# Patient Record
Sex: MALE | Race: OTHER | URBAN - METROPOLITAN AREA
[De-identification: names, ages, dates, MRNs, and addresses within clinical notes are randomized per-mention and may not be internally consistent; named-entity substitution may affect disease eponyms.]

---

## 2017-01-03 ENCOUNTER — EMERGENCY (EMERGENCY)
Facility: HOSPITAL | Age: 47
LOS: 1 days | Discharge: HOME | End: 2017-01-03
Attending: EMERGENCY MEDICINE | Admitting: EMERGENCY MEDICINE
Payer: MEDICAID

## 2017-01-03 VITALS
SYSTOLIC BLOOD PRESSURE: 98 MMHG | HEART RATE: 60 BPM | OXYGEN SATURATION: 98 % | RESPIRATION RATE: 16 BRPM | DIASTOLIC BLOOD PRESSURE: 57 MMHG | TEMPERATURE: 98 F

## 2017-01-03 VITALS
RESPIRATION RATE: 18 BRPM | HEART RATE: 75 BPM | TEMPERATURE: 98 F | DIASTOLIC BLOOD PRESSURE: 68 MMHG | SYSTOLIC BLOOD PRESSURE: 103 MMHG | OXYGEN SATURATION: 98 %

## 2017-01-03 DIAGNOSIS — R41.82 ALTERED MENTAL STATUS, UNSPECIFIED: ICD-10-CM

## 2017-01-03 DIAGNOSIS — F10.129 ALCOHOL ABUSE WITH INTOXICATION, UNSPECIFIED: ICD-10-CM

## 2017-01-03 PROCEDURE — 99283 EMERGENCY DEPT VISIT LOW MDM: CPT | Mod: 25

## 2017-01-03 NOTE — ED ADULT NURSE NOTE - OBJECTIVE STATEMENT
BIBA for public intox from [facility] authority. Pt admits to drinking excessively at the [employer]. Denies fall or injury, denies drug use. No obvious s/s trauma. Pt sleepy, but rouses to verbal stimuli and is subsequently alert. Oriented x3, + mildly slurred speech, + unsteady gait. Fall precautions in place, will continue to monitor pending sobriety.

## 2017-01-03 NOTE — ED PROVIDER NOTE - OBJECTIVE STATEMENT
47 yo M w/ no pertinent PMHx BIBA for public EtOH intox. Pt presents with +AOB. No acute medical complaints or apparent trauma noted.  Minimally cooperative with hx and ROS due to heavy intox. No bleeding, respiratory distress, pain, vomiting, or urinary/bowel incontinence noted.

## 2017-01-03 NOTE — ED PROVIDER NOTE - MEDICAL DECISION MAKING DETAILS
Patient BIBEMS for suspected alcohol intoxication. History and ROS limited due to altered state.  Observe to clinical sobriety.  PA STATEMENT: I personally performed the services described in the documentation, reviewed the documentation recorded by the scribe in my presence and it accurately and completely records my words and action.

## 2017-01-03 NOTE — ED ADULT NURSE NOTE - CHIEF COMPLAINT QUOTE
Pt BIBA found with an unsteady gait at [facility]. Pt went to the [facility] in [facility] and had a few drinks

## 2017-01-03 NOTE — ED ADULT TRIAGE NOTE - CHIEF COMPLAINT QUOTE
Pt BIBA found with an unsteady gait at [facility]. Pt went to the [facility] in [facility] and had a few drinks

## 2017-01-03 NOTE — ED PROVIDER NOTE - NS ED MD SCRIBE ATTENDING SCRIBE SECTIONS
PAST MEDICAL/SURGICAL/SOCIAL HISTORY/CONSULTATIONS/SHIFT CHANGE/VITAL SIGNS( Pullset)/PROGRESS NOTE/PHYSICAL EXAM/REVIEW OF SYSTEMS/DISPOSITION/RESULTS/HISTORY OF PRESENT ILLNESS/HIV

## 2017-01-03 NOTE — ED PROVIDER NOTE - ATTENDING CONTRIBUTION TO CARE
The patient is now awake and alert, clinically sober.  Able to walk a straight line.  Repeat exam and neuro/cranial nerve exams normal.  No evidence of head/neck trauma.  Patient denies any pain or other complaints.  Denies cp/sob/ha/abd pain.  Abd soft, lungs clear, heart exam normal.  Amy po challenge.  Patient says only used alcohol no other substances.  Denies any assault.  Feels much better and pt feels safe for discharge.  No evidence of intoxication at this time or alcohol withdrawal.  No other complaints on discharge.

## 2017-01-03 NOTE — ED PROVIDER NOTE - CONSTITUTIONAL, MLM
normal... Well appearing, well nourished, awake, alert, oriented to person, place, time/situation and in no apparent distress. +AOB

## 2017-08-24 ENCOUNTER — HOSPITAL ENCOUNTER (INPATIENT)
Dept: HOSPITAL 74 - YASAS | Age: 47
LOS: 4 days | Discharge: HOME | End: 2017-08-28
Attending: INTERNAL MEDICINE | Admitting: INTERNAL MEDICINE
Payer: COMMERCIAL

## 2017-08-24 VITALS — BODY MASS INDEX: 27.1 KG/M2

## 2017-08-24 DIAGNOSIS — F13.230: Primary | ICD-10-CM

## 2017-08-24 DIAGNOSIS — Z93.0: ICD-10-CM

## 2017-08-24 DIAGNOSIS — Z87.898: ICD-10-CM

## 2017-08-24 DIAGNOSIS — B35.3: ICD-10-CM

## 2017-08-24 DIAGNOSIS — F12.20: ICD-10-CM

## 2017-08-24 DIAGNOSIS — F10.230: ICD-10-CM

## 2017-08-24 DIAGNOSIS — R25.2: ICD-10-CM

## 2017-08-24 LAB
APPEARANCE UR: CLEAR
BILIRUB UR STRIP.AUTO-MCNC: NEGATIVE MG/DL
COLOR UR: YELLOW
KETONES UR QL STRIP: NEGATIVE
LEUKOCYTE ESTERASE UR QL STRIP.AUTO: NEGATIVE
NITRITE UR QL STRIP: NEGATIVE
PH UR: 5 [PH] (ref 5–8)
PROT UR QL STRIP: NEGATIVE
PROT UR QL STRIP: NEGATIVE
RBC # UR STRIP: NEGATIVE /UL
SP GR UR: 1.02 (ref 1–1.02)
UROBILINOGEN UR STRIP-MCNC: NEGATIVE MG/DL (ref 0.2–1)

## 2017-08-24 PROCEDURE — 8E09XY8 SUTURE REMOVAL FROM HEAD AND NECK REGION: ICD-10-PCS

## 2017-08-24 PROCEDURE — HZ2ZZZZ DETOXIFICATION SERVICES FOR SUBSTANCE ABUSE TREATMENT: ICD-10-PCS | Performed by: SURGERY

## 2017-08-24 RX ADMIN — ALUMINUM HYDROXIDE, MAGNESIUM HYDROXIDE, AND SIMETHICONE PRN ML: 200; 200; 20 SUSPENSION ORAL at 22:13

## 2017-08-24 RX ADMIN — Medication SCH MG: at 22:12

## 2017-08-24 RX ADMIN — BACITRACIN ZINC SCH GM: 500 OINTMENT TOPICAL at 22:12
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
## 2017-08-25 LAB
ALBUMIN SERPL-MCNC: 3.7 G/DL (ref 3.4–5)
ALP SERPL-CCNC: 97 U/L (ref 45–117)
ALT SERPL-CCNC: 25 U/L (ref 12–78)
ANION GAP SERPL CALC-SCNC: 7 MMOL/L (ref 8–16)
AST SERPL-CCNC: 19 U/L (ref 15–37)
BILIRUB SERPL-MCNC: 0.3 MG/DL (ref 0.2–1)
CALCIUM SERPL-MCNC: 8.3 MG/DL (ref 8.5–10.1)
CO2 SERPL-SCNC: 27 MMOL/L (ref 21–32)
CREAT SERPL-MCNC: 0.8 MG/DL (ref 0.7–1.3)
DEPRECATED RDW RBC AUTO: 14.8 % (ref 11.9–15.9)
GLUCOSE SERPL-MCNC: 107 MG/DL (ref 74–106)
HIV 1 & 2 AB: NEGATIVE
HIV 1 AGP24: NEGATIVE
MCH RBC QN AUTO: 28.2 PG (ref 25.7–33.7)
MCHC RBC AUTO-ENTMCNC: 32.3 G/DL (ref 32–35.9)
MCV RBC: 87.4 FL (ref 80–96)
PLATELET # BLD AUTO: 233 K/MM3 (ref 134–434)
PMV BLD: 8.4 FL (ref 7.5–11.1)
PROT SERPL-MCNC: 7.7 G/DL (ref 6.4–8.2)
WBC # BLD AUTO: 4.4 K/MM3 (ref 4–10)

## 2017-08-25 RX ADMIN — LOPERAMIDE HYDROCHLORIDE PRN MG: 2 CAPSULE ORAL at 19:46

## 2017-08-25 RX ADMIN — Medication SCH TAB: at 10:28

## 2017-08-25 RX ADMIN — BACITRACIN ZINC SCH GM: 500 OINTMENT TOPICAL at 22:15

## 2017-08-25 RX ADMIN — BACITRACIN ZINC SCH GM: 500 OINTMENT TOPICAL at 10:28

## 2017-08-25 RX ADMIN — LOPERAMIDE HYDROCHLORIDE PRN MG: 2 CAPSULE ORAL at 08:48

## 2017-08-25 RX ADMIN — Medication SCH MG: at 22:15

## 2017-08-25 NOTE — PN
S CIWA





- CIWA Score


Nausea/Vomitin


Muscle Tremors: 3


Anxiety: 3


Agitation: 2


Paroxysmal Sweats: 3


Orientation: 0-Oriented


Tacttile Disturbances: 2-Mild Itch/Numbness/Burn


Auditory Disturbances: 0-None


Visual Disturbances: 0-None


Headache: 0-None Present


CIWA-Ar Total Score: 15





BHS Progress Note (SOAP)


Subjective: 


sweats, shakes ,,cramps 


Objective: 





17 11:49


 Vital Signs











Temperature  96.8 F L  17 10:24


 


Pulse Rate  69   17 10:24


 


Respiratory Rate  18   17 10:24


 


Blood Pressure  131/78   17 10:24


 


O2 Sat by Pulse Oximetry (%)      








 Laboratory Tests











  17





  12:40 15:00 06:00


 


WBC    4.4


 


RBC    4.00


 


Hgb    11.3 L


 


Hct    35.0 L


 


MCV    87.4


 


MCH    28.2


 


MCHC    32.3


 


RDW    14.8


 


Plt Count    233


 


MPV    8.4


 


Sodium   


 


Potassium   


 


Chloride   


 


Carbon Dioxide   


 


Anion Gap   


 


BUN   


 


Creatinine   


 


Creat Clearance w eGFR   


 


Random Glucose   


 


Calcium   


 


Total Bilirubin   


 


AST   


 


ALT   


 


Alkaline Phosphatase   


 


Total Protein   


 


Albumin   


 


Urine Color   Yellow 


 


Urine Appearance   Clear 


 


Urine pH   5.0  D 


 


Ur Specific Gravity   1.025 


 


Urine Protein   Negative 


 


Urine Glucose (UA)   Negative 


 


Urine Ketones   Negative 


 


Urine Blood   Negative 


 


Urine Nitrite   Negative 


 


Urine Bilirubin   Negative 


 


Urine Urobilinogen   Negative 


 


Ur Leukocyte Esterase   Negative 


 


HIV 1&2 Antibody Screen  Negative  


 


HIV P24 Antigen  Negative  














  17





  06:00


 


WBC 


 


RBC 


 


Hgb 


 


Hct 


 


MCV 


 


MCH 


 


MCHC 


 


RDW 


 


Plt Count 


 


MPV 


 


Sodium  141


 


Potassium  4.0


 


Chloride  107


 


Carbon Dioxide  27


 


Anion Gap  7 L


 


BUN  20 H


 


Creatinine  0.8


 


Creat Clearance w eGFR  > 60


 


Random Glucose  107 H


 


Calcium  8.3 L


 


Total Bilirubin  0.3  D


 


AST  19  D


 


ALT  25  D


 


Alkaline Phosphatase  97


 


Total Protein  7.7


 


Albumin  3.7


 


Urine Color 


 


Urine Appearance 


 


Urine pH 


 


Ur Specific Gravity 


 


Urine Protein 


 


Urine Glucose (UA) 


 


Urine Ketones 


 


Urine Blood 


 


Urine Nitrite 


 


Urine Bilirubin 


 


Urine Urobilinogen 


 


Ur Leukocyte Esterase 


 


HIV 1&2 Antibody Screen 


 


HIV P24 Antigen 








pt aox3 in nad ambulating 


Assessment: 


17 11:49


withdrawal sx's 


t.pedis


cramps i  toes 








17 11:50





Plan: 


cont. detox 


increase fluids 


tinactin bid 


flexeril 10mg tid

## 2017-08-25 NOTE — EKG
Test Reason : 

Blood Pressure : ***/*** mmHG

Vent. Rate : 054 BPM     Atrial Rate : 054 BPM

   P-R Int : 132 ms          QRS Dur : 110 ms

    QT Int : 460 ms       P-R-T Axes : 075 072 039 degrees

   QTc Int : 436 ms

 

SINUS BRADYCARDIA

ST ELEVATION, CONSIDER EARLY REPOLARIZATION

OTHERWISE NORMAL ECG

NO PREVIOUS ECGS AVAILABLE

Confirmed by MD ETTA, CONSUELO ([id number]) on 8/25/2017 12:56:32 PM

 

Referred By:             Confirmed By:CONSUELO QUILES MD

## 2017-08-26 RX ADMIN — TOLNAFTATE SCH APPLIC: 10 CREAM TOPICAL at 22:37

## 2017-08-26 RX ADMIN — ALUMINUM HYDROXIDE, MAGNESIUM HYDROXIDE, AND SIMETHICONE PRN ML: 200; 200; 20 SUSPENSION ORAL at 22:38

## 2017-08-26 RX ADMIN — TOLNAFTATE SCH APPLIC: 10 CREAM TOPICAL at 13:05

## 2017-08-26 RX ADMIN — Medication SCH APPLIC: at 13:04

## 2017-08-26 RX ADMIN — Medication SCH TAB: at 10:34

## 2017-08-26 RX ADMIN — Medication SCH MG: at 22:37

## 2017-08-26 RX ADMIN — PHENOL PRN EACH: 14.5 LOZENGE ORAL at 05:37

## 2017-08-26 RX ADMIN — BACITRACIN ZINC SCH GM: 500 OINTMENT TOPICAL at 22:37

## 2017-08-26 RX ADMIN — BACITRACIN ZINC SCH GM: 500 OINTMENT TOPICAL at 10:35

## 2017-08-26 NOTE — PN
S CIWA





- CIWA Score


Nausea/Vomitin


Muscle Tremors: None


Anxiety: 4-Mod. Anxious/Guarded


Agitation: 2


Paroxysmal Sweats: 3


Orientation: 0-Oriented


Tacttile Disturbances: 0-None


Auditory Disturbances: 2-Mild Harshness/Frighten


Visual Disturbances: 3-Moderate Sensitivity


Headache: 0-None Present


CIWA-Ar Total Score: 16





BHS Progress Note (SOAP)


Subjective: 


Interrupted sleep, Stomach Cramping, Body Aches, H/A, Diarrhea, Sweating.


Objective: 


PT. A &  O X 3. NO ACUTE DISTRESS.


PT. DENIES CHEST PAIN.





17 14:17


 Vital Signs











Temperature  97.9 F   17 10:11


 


Pulse Rate  75   17 10:11


 


Respiratory Rate  18   17 10:11


 


Blood Pressure  137/73   17 10:11


 


O2 Sat by Pulse Oximetry (%)      








 Laboratory Tests











  17





  12:40 15:00 06:00


 


WBC    4.4


 


RBC    4.00


 


Hgb    11.3 L


 


Hct    35.0 L


 


MCV    87.4


 


MCH    28.2


 


MCHC    32.3


 


RDW    14.8


 


Plt Count    233


 


MPV    8.4


 


Sodium   


 


Potassium   


 


Chloride   


 


Carbon Dioxide   


 


Anion Gap   


 


BUN   


 


Creatinine   


 


Creat Clearance w eGFR   


 


Random Glucose   


 


Calcium   


 


Total Bilirubin   


 


AST   


 


ALT   


 


Alkaline Phosphatase   


 


Total Protein   


 


Albumin   


 


Urine Color   Yellow 


 


Urine Appearance   Clear 


 


Urine pH   5.0  D 


 


Ur Specific Gravity   1.025 


 


Urine Protein   Negative 


 


Urine Glucose (UA)   Negative 


 


Urine Ketones   Negative 


 


Urine Blood   Negative 


 


Urine Nitrite   Negative 


 


Urine Bilirubin   Negative 


 


Urine Urobilinogen   Negative 


 


Ur Leukocyte Esterase   Negative 


 


RPR Titer   


 


HIV 1&2 Antibody Screen  Negative  


 


HIV P24 Antigen  Negative  














  17





  06:00 06:00


 


WBC  


 


RBC  


 


Hgb  


 


Hct  


 


MCV  


 


MCH  


 


MCHC  


 


RDW  


 


Plt Count  


 


MPV  


 


Sodium  141 


 


Potassium  4.0 


 


Chloride  107 


 


Carbon Dioxide  27 


 


Anion Gap  7 L 


 


BUN  20 H 


 


Creatinine  0.8 


 


Creat Clearance w eGFR  > 60 


 


Random Glucose  107 H 


 


Calcium  8.3 L 


 


Total Bilirubin  0.3  D 


 


AST  19  D 


 


ALT  25  D 


 


Alkaline Phosphatase  97 


 


Total Protein  7.7 


 


Albumin  3.7 


 


Urine Color  


 


Urine Appearance  


 


Urine pH  


 


Ur Specific Gravity  


 


Urine Protein  


 


Urine Glucose (UA)  


 


Urine Ketones  


 


Urine Blood  


 


Urine Nitrite  


 


Urine Bilirubin  


 


Urine Urobilinogen  


 


Ur Leukocyte Esterase  


 


RPR Titer   Nonreactive


 


HIV 1&2 Antibody Screen  


 


HIV P24 Antigen  








LABS NOTED.


Assessment: 





17 14:18


WITHDRAWAL SYMPTOMS.


Plan: 


CONTINUE DETOX.


INCREASE PO FLUID INTAKE.


PRN FLEXERIL FOR BODY ACHES / MUSCLE SPASMS.

## 2017-08-27 RX ADMIN — BACITRACIN ZINC SCH GM: 500 OINTMENT TOPICAL at 22:16

## 2017-08-27 RX ADMIN — PHENOL PRN EACH: 14.5 LOZENGE ORAL at 05:29

## 2017-08-27 RX ADMIN — TOLNAFTATE SCH: 10 CREAM TOPICAL at 22:17

## 2017-08-27 RX ADMIN — LOPERAMIDE HYDROCHLORIDE PRN MG: 2 CAPSULE ORAL at 05:29

## 2017-08-27 RX ADMIN — Medication SCH APPLIC: at 10:34

## 2017-08-27 RX ADMIN — Medication SCH TAB: at 10:33

## 2017-08-27 RX ADMIN — BACITRACIN ZINC SCH GM: 500 OINTMENT TOPICAL at 10:32

## 2017-08-27 RX ADMIN — TOLNAFTATE SCH APPLIC: 10 CREAM TOPICAL at 10:32

## 2017-08-27 RX ADMIN — Medication SCH MG: at 22:16
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
## 2017-08-28 VITALS — HEART RATE: 78 BPM | DIASTOLIC BLOOD PRESSURE: 78 MMHG | TEMPERATURE: 97 F | SYSTOLIC BLOOD PRESSURE: 140 MMHG

## 2018-07-27 ENCOUNTER — HOSPITAL ENCOUNTER (INPATIENT)
Dept: HOSPITAL 74 - YASAS | Age: 48
LOS: 4 days | Discharge: HOME | End: 2018-07-31
Attending: SURGERY | Admitting: SURGERY
Payer: COMMERCIAL

## 2018-07-27 VITALS — BODY MASS INDEX: 27.1 KG/M2

## 2018-07-27 DIAGNOSIS — K21.9: ICD-10-CM

## 2018-07-27 DIAGNOSIS — F13.230: ICD-10-CM

## 2018-07-27 DIAGNOSIS — F10.230: Primary | ICD-10-CM

## 2018-07-27 DIAGNOSIS — R19.7: ICD-10-CM

## 2018-07-27 PROCEDURE — HZ2ZZZZ DETOXIFICATION SERVICES FOR SUBSTANCE ABUSE TREATMENT: ICD-10-PCS | Performed by: SURGERY

## 2018-07-27 RX ADMIN — Medication SCH MG: at 22:11

## 2018-07-27 RX ADMIN — RANITIDINE SCH MG: 150 TABLET ORAL at 13:37

## 2018-07-27 RX ADMIN — Medication PRN MG: at 22:11
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
## 2018-07-28 LAB
ALBUMIN SERPL-MCNC: 4.2 G/DL (ref 3.4–5)
ALP SERPL-CCNC: 92 U/L (ref 45–117)
ALT SERPL-CCNC: 40 U/L (ref 12–78)
ANION GAP SERPL CALC-SCNC: 7 MMOL/L (ref 8–16)
APPEARANCE UR: (no result)
AST SERPL-CCNC: 40 U/L (ref 15–37)
BACTERIA #/AREA URNS HPF: (no result) /HPF
BILIRUB SERPL-MCNC: 1.2 MG/DL (ref 0.2–1)
BILIRUB UR STRIP.AUTO-MCNC: NEGATIVE MG/DL
BUN SERPL-MCNC: 19 MG/DL (ref 7–18)
CALCIUM SERPL-MCNC: 8.9 MG/DL (ref 8.5–10.1)
CHLORIDE SERPL-SCNC: 107 MMOL/L (ref 98–107)
CO2 SERPL-SCNC: 26 MMOL/L (ref 21–32)
COLOR UR: (no result)
CREAT SERPL-MCNC: 1 MG/DL (ref 0.7–1.3)
DEPRECATED RDW RBC AUTO: 15.3 % (ref 11.9–15.9)
GLUCOSE SERPL-MCNC: 124 MG/DL (ref 74–106)
HCT VFR BLD CALC: 36.8 % (ref 35.4–49)
HGB BLD-MCNC: 12.3 GM/DL (ref 11.7–16.9)
KETONES UR QL STRIP: NEGATIVE
LEUKOCYTE ESTERASE UR QL STRIP.AUTO: NEGATIVE
MCH RBC QN AUTO: 30.2 PG (ref 25.7–33.7)
MCHC RBC AUTO-ENTMCNC: 33.3 G/DL (ref 32–35.9)
MCV RBC: 90.5 FL (ref 80–96)
MUCOUS THREADS URNS QL MICRO: (no result)
NITRITE UR QL STRIP: NEGATIVE
PH UR: 5 [PH] (ref 5–8)
PLATELET # BLD AUTO: 249 K/MM3 (ref 134–434)
PMV BLD: 8.3 FL (ref 7.5–11.1)
POTASSIUM SERPLBLD-SCNC: 3.8 MMOL/L (ref 3.5–5.1)
PROT SERPL-MCNC: 8.1 G/DL (ref 6.4–8.2)
PROT UR QL STRIP: NEGATIVE
PROT UR QL STRIP: NEGATIVE
RBC # BLD AUTO: 4.07 M/MM3 (ref 4–5.6)
SODIUM SERPL-SCNC: 140 MMOL/L (ref 136–145)
SP GR UR: 1.02 (ref 1–1.03)
UROBILINOGEN UR STRIP-MCNC: 2 MG/DL (ref 0.2–1)
WBC # BLD AUTO: 4.5 K/MM3 (ref 4–10)

## 2018-07-28 RX ADMIN — RANITIDINE SCH MG: 150 TABLET ORAL at 10:34

## 2018-07-28 RX ADMIN — HYDROXYZINE PAMOATE PRN MG: 25 CAPSULE ORAL at 22:30

## 2018-07-28 RX ADMIN — LOPERAMIDE HYDROCHLORIDE PRN MG: 2 CAPSULE ORAL at 13:46

## 2018-07-28 RX ADMIN — Medication PRN MG: at 22:31

## 2018-07-28 RX ADMIN — Medication SCH TAB: at 10:34

## 2018-07-28 RX ADMIN — ALUMINUM HYDROXIDE, MAGNESIUM HYDROXIDE, AND SIMETHICONE PRN ML: 200; 200; 20 SUSPENSION ORAL at 05:55

## 2018-07-28 RX ADMIN — Medication SCH MG: at 22:30

## 2018-07-28 NOTE — PN
[facility] CIWA





- CIWA Score


Nausea/Vomitin-No Nausea/No Vomiting


Muscle Tremors: 4-Moderate,w/Arms Extend


Anxiety: 3


Agitation: 3


Paroxysmal Sweats: 3


Orientation: 0-Oriented


Tacttile Disturbances: 2-Mild Itch/Numbness/Burn


Auditory Disturbances: 2-Mild Harshness/Frighten


Visual Disturbances: 0-None


Headache: 0-None Present


CIWA-Ar Total Score: 17





BHS Progress Note (SOAP)


Subjective: 





Interrupted Sleep, Tremors, Diarrhea, Sweating, Body Aches.


Objective: 


PATIENT A & O X 3, OBSERVED AMBULATING ON UNIT. NO ACUTE DISTRESS.





18 17:48


 Vital Signs











Temperature  96.7 F L  18 14:01


 


Pulse Rate  55 L  18 14:01


 


Respiratory Rate  18   18 14:01


 


Blood Pressure  151/79   18 14:01


 


O2 Sat by Pulse Oximetry (%)      








 Laboratory Tests











  18





  12:00 05:30 05:30


 


WBC   4.5 


 


RBC   4.07 


 


Hgb   12.3 


 


Hct   36.8 


 


MCV   90.5 


 


MCH   30.2 


 


MCHC   33.3 


 


RDW   15.3 


 


Plt Count   249 


 


MPV   8.3 


 


Sodium    140


 


Potassium    3.8


 


Chloride    107


 


Carbon Dioxide    26


 


Anion Gap    7 L


 


BUN    19 H


 


Creatinine    1.0


 


Creat Clearance w eGFR    > 60


 


Random Glucose    124 H


 


Calcium    8.9


 


Total Bilirubin    1.2 H


 


AST    40 H D


 


ALT    40  D


 


Alkaline Phosphatase    92


 


Total Protein    8.1


 


Albumin    4.2


 


Urine Color   


 


Urine Appearance   


 


Urine pH   


 


Ur Specific Gravity   


 


Urine Protein   


 


Urine Glucose (UA)   


 


Urine Ketones   


 


Urine Blood   


 


Urine Nitrite   


 


Urine Bilirubin   


 


Urine Urobilinogen   


 


Ur Leukocyte Esterase   


 


Urine WBC (Auto)   


 


Urine RBC (Auto)   


 


Urine Bacteria   


 


Urine Mucus   


 


RPR Titer   


 


HIV 1&2 Antibody Screen  Negative  


 


HIV P24 Antigen  Negative  














  18





  05:30 05:50


 


WBC  


 


RBC  


 


Hgb  


 


Hct  


 


MCV  


 


MCH  


 


MCHC  


 


RDW  


 


Plt Count  


 


MPV  


 


Sodium  


 


Potassium  


 


Chloride  


 


Carbon Dioxide  


 


Anion Gap  


 


BUN  


 


Creatinine  


 


Creat Clearance w eGFR  


 


Random Glucose  


 


Calcium  


 


Total Bilirubin  


 


AST  


 


ALT  


 


Alkaline Phosphatase  


 


Total Protein  


 


Albumin  


 


Urine Color   Melissa


 


Urine Appearance   Turbid


 


Urine pH   5.0


 


Ur Specific Gravity   1.023


 


Urine Protein   Negative


 


Urine Glucose (UA)   Negative


 


Urine Ketones   Negative


 


Urine Blood   1+ H


 


Urine Nitrite   Negative


 


Urine Bilirubin   Negative


 


Urine Urobilinogen   2.0


 


Ur Leukocyte Esterase   Negative


 


Urine WBC (Auto)   2


 


Urine RBC (Auto)   4


 


Urine Bacteria   Rare


 


Urine Mucus   Few


 


RPR Titer  Nonreactive 


 


HIV 1&2 Antibody Screen  


 


HIV P24 Antigen  








LABS NOTED.


Assessment: 





18 17:48


WITHDRAWAL SYMPTOMS.


Plan: 





CONTINUE DETOX.


INCREASE DAILY PO FLUID INTAKE.


PRN FLEXERIL FOR BODY ACHES / MUSCLE SPASMS.

## 2018-07-29 RX ADMIN — RANITIDINE SCH MG: 150 TABLET ORAL at 10:44

## 2018-07-29 RX ADMIN — Medication SCH MG: at 22:24

## 2018-07-29 RX ADMIN — HYDROXYZINE PAMOATE PRN MG: 25 CAPSULE ORAL at 22:24

## 2018-07-29 RX ADMIN — TETRAHYDROZOLINE HCL PRN DROP: 0.05 SOLUTION/ DROPS OPHTHALMIC at 10:44

## 2018-07-29 RX ADMIN — Medication SCH TAB: at 10:43

## 2018-07-29 RX ADMIN — ALUMINUM HYDROXIDE, MAGNESIUM HYDROXIDE, AND SIMETHICONE PRN ML: 200; 200; 20 SUSPENSION ORAL at 05:22

## 2018-07-29 RX ADMIN — LOPERAMIDE HYDROCHLORIDE PRN MG: 2 CAPSULE ORAL at 17:51

## 2018-07-29 NOTE — PN
[facility] CIWA





- CIWA Score


Nausea/Vomitin-Mild Nausea/No Vomiting


Muscle Tremors: 1-None Visible, but Felt


Anxiety: 1-Mildly Anxious


Agitation: 1-Slight > Activity


Paroxysmal Sweats: 1-Minimal Palms Moist


Orientation: 0-Oriented


Tacttile Disturbances: 0-None


Auditory Disturbances: 0-None


Visual Disturbances: 0-None


Headache: 0-None Present


CIWA-Ar Total Score: 5





BHS Progress Note (SOAP)


Subjective: 





pt complaining of diarrhea- watery stool-able to take po, no fevers, no abd pain

, no recent antibiotic use. d/w pt imodium prn that he can ask for-


Objective: 





18 15:35


 Vital Signs - 24 hr











  18





  18:18 22:24 00:30


 


Temperature 98.0 F 98.4 F 


 


Pulse Rate 55 L 57 L 


 


Respiratory 16 18 18





Rate   


 


Blood Pressure 139/74 155/88 














  18





  03:30 06:26 09:28


 


Temperature  97.4 F L 97 F L


 


Pulse Rate  50 L 70


 


Respiratory 18 18 20





Rate   


 


Blood Pressure  134/70 138/82














  18





  13:33


 


Temperature 98.6 F


 


Pulse Rate 68


 


Respiratory 18





Rate 


 


Blood Pressure 140/83








 Breath Alcohol Content











Breath Alcohol Content         0











 Laboratory Tests











  18





  12:00 05:30 05:30


 


WBC   4.5 


 


RBC   4.07 


 


Hgb   12.3 


 


Hct   36.8 


 


MCV   90.5 


 


MCH   30.2 


 


MCHC   33.3 


 


RDW   15.3 


 


Plt Count   249 


 


MPV   8.3 


 


Sodium    140


 


Potassium    3.8


 


Chloride    107


 


Carbon Dioxide    26


 


Anion Gap    7 L


 


BUN    19 H


 


Creatinine    1.0


 


Creat Clearance w eGFR    > 60


 


Random Glucose    124 H


 


Calcium    8.9


 


Total Bilirubin    1.2 H


 


AST    40 H D


 


ALT    40  D


 


Alkaline Phosphatase    92


 


Total Protein    8.1


 


Albumin    4.2


 


Urine Color   


 


Urine Appearance   


 


Urine pH   


 


Ur Specific Gravity   


 


Urine Protein   


 


Urine Glucose (UA)   


 


Urine Ketones   


 


Urine Blood   


 


Urine Nitrite   


 


Urine Bilirubin   


 


Urine Urobilinogen   


 


Ur Leukocyte Esterase   


 


Urine WBC (Auto)   


 


Urine RBC (Auto)   


 


Urine Bacteria   


 


Urine Mucus   


 


RPR Titer   


 


HIV 1&2 Antibody Screen  Negative  


 


HIV P24 Antigen  Negative  














  18





  05:30 05:50


 


WBC  


 


RBC  


 


Hgb  


 


Hct  


 


MCV  


 


MCH  


 


MCHC  


 


RDW  


 


Plt Count  


 


MPV  


 


Sodium  


 


Potassium  


 


Chloride  


 


Carbon Dioxide  


 


Anion Gap  


 


BUN  


 


Creatinine  


 


Creat Clearance w eGFR  


 


Random Glucose  


 


Calcium  


 


Total Bilirubin  


 


AST  


 


ALT  


 


Alkaline Phosphatase  


 


Total Protein  


 


Albumin  


 


Urine Color   Melissa


 


Urine Appearance   Turbid


 


Urine pH   5.0


 


Ur Specific Gravity   1.023


 


Urine Protein   Negative


 


Urine Glucose (UA)   Negative


 


Urine Ketones   Negative


 


Urine Blood   1+ H


 


Urine Nitrite   Negative


 


Urine Bilirubin   Negative


 


Urine Urobilinogen   2.0


 


Ur Leukocyte Esterase   Negative


 


Urine WBC (Auto)   2


 


Urine RBC (Auto)   4


 


Urine Bacteria   Rare


 


Urine Mucus   Few


 


RPR Titer  Nonreactive 


 


HIV 1&2 Antibody Screen  


 


HIV P24 Antigen  








nl VS


increased liver enzymes


soft, NT abd, pt walking around


Assessment: 





18 15:36


here for detox form alcohol,xanax


Plan: 





continue detox


prn imodium

## 2018-07-30 RX ADMIN — Medication SCH MG: at 22:01

## 2018-07-30 RX ADMIN — Medication SCH TAB: at 10:44

## 2018-07-30 RX ADMIN — LOPERAMIDE HYDROCHLORIDE PRN MG: 2 CAPSULE ORAL at 01:52

## 2018-07-30 RX ADMIN — TETRAHYDROZOLINE HCL PRN DROP: 0.05 SOLUTION/ DROPS OPHTHALMIC at 10:47

## 2018-07-30 RX ADMIN — RANITIDINE SCH MG: 150 TABLET ORAL at 10:44

## 2018-07-30 NOTE — PN
BHS Progress Note (SOAP)


Subjective: 





Sweating, Diarrhea, Stomach Cramping, Body Aches, Fatigue, Tremors.


Objective: 


PATIENT A & O X 3. NO ACUTE DISTRESS.





07/30/18 12:18


 Vital Signs











Temperature  97.9 F   07/30/18 09:44


 


Pulse Rate  63   07/30/18 09:44


 


Respiratory Rate  18   07/30/18 09:44


 


Blood Pressure  141/79   07/30/18 09:44


 


O2 Sat by Pulse Oximetry (%)      








 Laboratory Tests











  07/27/18 07/28/18 07/28/18





  12:00 05:30 05:30


 


WBC   4.5 


 


RBC   4.07 


 


Hgb   12.3 


 


Hct   36.8 


 


MCV   90.5 


 


MCH   30.2 


 


MCHC   33.3 


 


RDW   15.3 


 


Plt Count   249 


 


MPV   8.3 


 


Sodium    140


 


Potassium    3.8


 


Chloride    107


 


Carbon Dioxide    26


 


Anion Gap    7 L


 


BUN    19 H


 


Creatinine    1.0


 


Creat Clearance w eGFR    > 60


 


Random Glucose    124 H


 


Calcium    8.9


 


Total Bilirubin    1.2 H


 


AST    40 H D


 


ALT    40  D


 


Alkaline Phosphatase    92


 


Total Protein    8.1


 


Albumin    4.2


 


Urine Color   


 


Urine Appearance   


 


Urine pH   


 


Ur Specific Gravity   


 


Urine Protein   


 


Urine Glucose (UA)   


 


Urine Ketones   


 


Urine Blood   


 


Urine Nitrite   


 


Urine Bilirubin   


 


Urine Urobilinogen   


 


Ur Leukocyte Esterase   


 


Urine WBC (Auto)   


 


Urine RBC (Auto)   


 


Urine Bacteria   


 


Urine Mucus   


 


RPR Titer   


 


HIV 1&2 Antibody Screen  Negative  


 


HIV P24 Antigen  Negative  














  07/28/18 07/28/18





  05:30 05:50


 


WBC  


 


RBC  


 


Hgb  


 


Hct  


 


MCV  


 


MCH  


 


MCHC  


 


RDW  


 


Plt Count  


 


MPV  


 


Sodium  


 


Potassium  


 


Chloride  


 


Carbon Dioxide  


 


Anion Gap  


 


BUN  


 


Creatinine  


 


Creat Clearance w eGFR  


 


Random Glucose  


 


Calcium  


 


Total Bilirubin  


 


AST  


 


ALT  


 


Alkaline Phosphatase  


 


Total Protein  


 


Albumin  


 


Urine Color   Melissa


 


Urine Appearance   Turbid


 


Urine pH   5.0


 


Ur Specific Gravity   1.023


 


Urine Protein   Negative


 


Urine Glucose (UA)   Negative


 


Urine Ketones   Negative


 


Urine Blood   1+ H


 


Urine Nitrite   Negative


 


Urine Bilirubin   Negative


 


Urine Urobilinogen   2.0


 


Ur Leukocyte Esterase   Negative


 


Urine WBC (Auto)   2


 


Urine RBC (Auto)   4


 


Urine Bacteria   Rare


 


Urine Mucus   Few


 


RPR Titer  Nonreactive 


 


HIV 1&2 Antibody Screen  


 


HIV P24 Antigen  








LABS NOTED.


Assessment: 





07/30/18 12:18


WITHDRAWAL SYMPTOMS.


Plan: 





CONTINUE DETOX.


INCREASE DAILY PO FLUID INTAKE.


PRN IMMODIUM FOR DIARRHEA.





PATIENT SCHEDULED FOR D/C TOMORROW.

## 2018-07-30 NOTE — EKG
Test Reason : 

Blood Pressure : ***/*** mmHG

Vent. Rate : 051 BPM     Atrial Rate : 051 BPM

   P-R Int : 138 ms          QRS Dur : 108 ms

    QT Int : 458 ms       P-R-T Axes : 070 070 042 degrees

   QTc Int : 422 ms

 

SINUS BRADYCARDIA

POSSIBLE LEFT ATRIAL ENLARGEMENT

LEFT VENTRICULAR HYPERTROPHY

ABNORMAL ECG

WHEN COMPARED WITH ECG OF 24-AUG-2017 13:51,

NO SIGNIFICANT CHANGE WAS FOUND

Confirmed by VALERIE WILDER MD ([id number]) on 7/30/2018 11:10:26 AM

 

Referred By:             Confirmed By:VALERIE WILDER MD

## 2018-07-31 VITALS — DIASTOLIC BLOOD PRESSURE: 77 MMHG | SYSTOLIC BLOOD PRESSURE: 141 MMHG | HEART RATE: 51 BPM | TEMPERATURE: 97.9 F

## 2018-10-12 ENCOUNTER — HOSPITAL ENCOUNTER (INPATIENT)
Dept: HOSPITAL 74 - YASAS | Age: 48
LOS: 4 days | Discharge: HOME | End: 2018-10-16
Attending: INTERNAL MEDICINE | Admitting: INTERNAL MEDICINE
Payer: COMMERCIAL

## 2018-10-12 VITALS — BODY MASS INDEX: 26.6 KG/M2

## 2018-10-12 DIAGNOSIS — Z87.898: ICD-10-CM

## 2018-10-12 DIAGNOSIS — E80.7: ICD-10-CM

## 2018-10-12 DIAGNOSIS — F10.230: Primary | ICD-10-CM

## 2018-10-12 DIAGNOSIS — F13.230: ICD-10-CM

## 2018-10-12 DIAGNOSIS — F12.20: ICD-10-CM

## 2018-10-12 DIAGNOSIS — R82.90: ICD-10-CM

## 2018-10-12 LAB
APPEARANCE UR: CLEAR
BILIRUB UR STRIP.AUTO-MCNC: NEGATIVE MG/DL
COLOR UR: YELLOW
EPITH CASTS URNS QL MICRO: (no result) /HPF
HYALINE CASTS URNS QL MICRO: 3 /LPF
KETONES UR QL STRIP: NEGATIVE
LEUKOCYTE ESTERASE UR QL STRIP.AUTO: NEGATIVE
MUCOUS THREADS URNS QL MICRO: (no result)
NITRITE UR QL STRIP: NEGATIVE
PH UR: 7 [PH] (ref 5–8)
PROT UR QL STRIP: (no result)
PROT UR QL STRIP: NEGATIVE
SP GR UR: 1.02 (ref 1.01–1.03)
UROBILINOGEN UR STRIP-MCNC: 2 MG/DL (ref 0.2–1)

## 2018-10-12 PROCEDURE — HZ2ZZZZ DETOXIFICATION SERVICES FOR SUBSTANCE ABUSE TREATMENT: ICD-10-PCS | Performed by: SURGERY

## 2018-10-12 RX ADMIN — PSEUDOEPHEDRINE HCL AND TRIPROLIDINE HCL PRN COMBO: 60; 2.5 TABLET, FILM COATED ORAL at 18:44

## 2018-10-12 RX ADMIN — PHENOL PRN EACH: 14.5 LOZENGE ORAL at 22:11

## 2018-10-12 RX ADMIN — Medication SCH MG: at 22:10

## 2018-10-12 RX ADMIN — GUAIFENESIN AND DEXTROMETHORPHAN PRN ML: 100; 10 SYRUP ORAL at 15:55

## 2018-10-12 RX ADMIN — LOPERAMIDE HYDROCHLORIDE PRN MG: 2 CAPSULE ORAL at 22:31

## 2018-10-12 RX ADMIN — PHENOL PRN EACH: 14.5 LOZENGE ORAL at 17:11
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
## 2018-10-13 LAB
ALBUMIN SERPL-MCNC: 3.6 G/DL (ref 3.4–5)
ALP SERPL-CCNC: 99 U/L (ref 45–117)
ALT SERPL-CCNC: 32 U/L (ref 13–61)
ANION GAP SERPL CALC-SCNC: 9 MMOL/L (ref 8–16)
AST SERPL-CCNC: 44 U/L (ref 15–37)
BILIRUB SERPL-MCNC: 1.8 MG/DL (ref 0.2–1)
BUN SERPL-MCNC: 7 MG/DL (ref 7–18)
CALCIUM SERPL-MCNC: 8.9 MG/DL (ref 8.5–10.1)
CHLORIDE SERPL-SCNC: 102 MMOL/L (ref 98–107)
CO2 SERPL-SCNC: 26 MMOL/L (ref 21–32)
CREAT SERPL-MCNC: 0.9 MG/DL (ref 0.55–1.3)
DEPRECATED RDW RBC AUTO: 14.6 % (ref 11.9–15.9)
GLUCOSE SERPL-MCNC: 84 MG/DL (ref 74–106)
HCT VFR BLD CALC: 38.2 % (ref 35.4–49)
HGB BLD-MCNC: 12.7 GM/DL (ref 11.7–16.9)
MCH RBC QN AUTO: 29.6 PG (ref 25.7–33.7)
MCHC RBC AUTO-ENTMCNC: 33.3 G/DL (ref 32–35.9)
MCV RBC: 88.9 FL (ref 80–96)
PLATELET # BLD AUTO: 188 K/MM3 (ref 134–434)
PMV BLD: 8.8 FL (ref 7.5–11.1)
POTASSIUM SERPLBLD-SCNC: 3.6 MMOL/L (ref 3.5–5.1)
PROT SERPL-MCNC: 7.7 G/DL (ref 6.4–8.2)
RBC # BLD AUTO: 4.29 M/MM3 (ref 4–5.6)
SODIUM SERPL-SCNC: 138 MMOL/L (ref 136–145)
WBC # BLD AUTO: 4 K/MM3 (ref 4–10)

## 2018-10-13 RX ADMIN — PSEUDOEPHEDRINE HCL AND TRIPROLIDINE HCL PRN COMBO: 60; 2.5 TABLET, FILM COATED ORAL at 05:38

## 2018-10-13 RX ADMIN — POLYVINYL ALCOHOL SCH DROP: 14 SOLUTION/ DROPS OPHTHALMIC at 22:07

## 2018-10-13 RX ADMIN — IBUPROFEN PRN MG: 400 TABLET, FILM COATED ORAL at 17:48

## 2018-10-13 RX ADMIN — GUAIFENESIN AND DEXTROMETHORPHAN PRN ML: 100; 10 SYRUP ORAL at 22:10

## 2018-10-13 RX ADMIN — PHENOL PRN EACH: 14.5 LOZENGE ORAL at 05:41

## 2018-10-13 RX ADMIN — LOPERAMIDE HYDROCHLORIDE PRN MG: 2 CAPSULE ORAL at 05:40

## 2018-10-13 RX ADMIN — POLYVINYL ALCOHOL SCH: 14 SOLUTION/ DROPS OPHTHALMIC at 15:40

## 2018-10-13 RX ADMIN — Medication SCH TAB: at 10:23

## 2018-10-13 RX ADMIN — Medication SCH MG: at 22:06

## 2018-10-13 RX ADMIN — IBUPROFEN PRN MG: 400 TABLET, FILM COATED ORAL at 10:53

## 2018-10-13 RX ADMIN — GUAIFENESIN AND DEXTROMETHORPHAN PRN ML: 100; 10 SYRUP ORAL at 10:25

## 2018-10-13 NOTE — PN
[facility] CIWA





- CIWA Score


Nausea/Vomitin


Muscle Tremors: 3


Anxiety: 2


Agitation: 2


Paroxysmal Sweats: 1-Minimal Palms Moist


Orientation: 0-Oriented


Tacttile Disturbances: 1-Very Mild Itch/Numbness


Auditory Disturbances: 1-Very Mild


Visual Disturbances: 1-Very Mild Sensitivity


Headache: 2-Mild


CIWA-Ar Total Score: 15





B[facility] Progress Note (SOAP)


Subjective: 





alert,irritable,anxious,interrupted sleep,tremor


Objective: 





10/13/18 16:26


 Vital Signs











Temperature  97.8 F   10/13/18 15:38


 


Pulse Rate  65   10/13/18 15:38


 


Respiratory Rate  18   10/13/18 15:38


 


Blood Pressure  127/74   10/13/18 15:38


 


O2 Sat by Pulse Oximetry (%)      











10/13/18 16:26


 Laboratory Last Values











WBC  4.0 K/mm3 (4.0-10.0)   10/13/18  08:00    


 


RBC  4.29 M/mm3 (4.00-5.60)   10/13/18  08:00    


 


Hgb  12.7 GM/dL (11.7-16.9)   10/13/18  08:00    


 


Hct  38.2 % (35.4-49)   10/13/18  08:00    


 


MCV  88.9 fl (80-96)   10/13/18  08:00    


 


MCH  29.6 pg (25.7-33.7)   10/13/18  08:00    


 


MCHC  33.3 g/dl (32.0-35.9)   10/13/18  08:00    


 


RDW  14.6 % (11.9-15.9)   10/13/18  08:00    


 


Plt Count  188 K/MM3 (134-434)  D 10/13/18  08:00    


 


MPV  8.8 fl (7.5-11.1)   10/13/18  08:00    


 


Sodium  138 mmol/L (136-145)   10/13/18  08:00    


 


Potassium  3.6 mmol/L (3.5-5.1)   10/13/18  08:00    


 


Chloride  102 mmol/L ()   10/13/18  08:00    


 


Carbon Dioxide  26 mmol/L (21-32)   10/13/18  08:00    


 


Anion Gap  9 MMOL/L (8-16)   10/13/18  08:00    


 


BUN  7 mg/dL (7-18)   10/13/18  08:00    


 


Creatinine  0.9 mg/dL (0.55-1.3)   10/13/18  08:00    


 


Creat Clearance w eGFR  > 60  (>60)   10/13/18  08:00    


 


Random Glucose  84 mg/dL ()   10/13/18  08:00    


 


Calcium  8.9 mg/dL (8.5-10.1)   10/13/18  08:00    


 


Total Bilirubin  1.8 mg/dL (0.2-1)  H  10/13/18  08:00    


 


AST  44 U/L (15-37)  H  10/13/18  08:00    


 


ALT  32 U/L (13-61)   10/13/18  08:00    


 


Alkaline Phosphatase  99 U/L ()   10/13/18  08:00    


 


Total Protein  7.7 g/dl (6.4-8.2)   10/13/18  08:00    


 


Albumin  3.6 g/dl (3.4-5.0)   10/13/18  08:00    


 


Urine Color  Yellow   10/12/18  17:00    


 


Urine Appearance  Clear   10/12/18  17:00    


 


Urine pH  7.0  (5.0-8.0)  D 10/12/18  17:00    


 


Ur Specific Gravity  1.019  (1.010-1.035)   10/12/18  17:00    


 


Urine Protein  1+  (NEGATIVE)  H  10/12/18  17:00    


 


Urine Glucose (UA)  Negative  (NEGATIVE)   10/12/18  17:00    


 


Urine Ketones  Negative  (NEGATIVE)   10/12/18  17:00    


 


Urine Blood  1+  (NEGATIVE)  H  10/12/18  17:00    


 


Urine Nitrite  Negative  (NEGATIVE)   10/12/18  17:00    


 


Urine Bilirubin  Negative  (<2.0 mg/dL)   10/12/18  17:00    


 


Urine Urobilinogen  2.0 mg/dL (0.2-1.0)   10/12/18  17:00    


 


Ur Leukocyte Esterase  Negative  (NEGATIVE)   10/12/18  17:00    


 


Urine WBC (Auto)  1 /hpf (3-5)   10/12/18  17:00    


 


Urine RBC (Auto)  2 /hpf (0-3)   10/12/18  17:00    


 


Ur Epithelial Cells  Rare /HPF (FEW)   10/12/18  17:00    


 


Hyaline Casts  3 /lpf  10/12/18  17:00    


 


Urine Mucus  Moderate   10/12/18  17:00    


 


RPR Titer  Nonreactive  (NONREACTIVE)   10/13/18  08:00    


 


HIV 1&2 Antibody Screen  Negative   10/13/18  08:00    


 


HIV P24 Antigen  Negative   10/13/18  08:00    











Assessment: 





10/13/18 16:27


withdrawal symptom


Plan: 





continue detox

## 2018-10-14 RX ADMIN — POLYVINYL ALCOHOL SCH DROP: 14 SOLUTION/ DROPS OPHTHALMIC at 05:48

## 2018-10-14 RX ADMIN — PSEUDOEPHEDRINE HCL AND TRIPROLIDINE HCL PRN COMBO: 60; 2.5 TABLET, FILM COATED ORAL at 05:51

## 2018-10-14 RX ADMIN — PHENOL PRN EACH: 14.5 LOZENGE ORAL at 05:49

## 2018-10-14 RX ADMIN — PHENOL PRN EACH: 14.5 LOZENGE ORAL at 22:44

## 2018-10-14 RX ADMIN — POLYVINYL ALCOHOL SCH DROP: 14 SOLUTION/ DROPS OPHTHALMIC at 22:41

## 2018-10-14 RX ADMIN — PHENOL PRN EACH: 14.5 LOZENGE ORAL at 18:02

## 2018-10-14 RX ADMIN — POLYVINYL ALCOHOL SCH: 14 SOLUTION/ DROPS OPHTHALMIC at 14:14

## 2018-10-14 RX ADMIN — Medication SCH TAB: at 10:08

## 2018-10-14 RX ADMIN — IBUPROFEN PRN MG: 400 TABLET, FILM COATED ORAL at 10:10

## 2018-10-14 RX ADMIN — LOPERAMIDE HYDROCHLORIDE PRN MG: 2 CAPSULE ORAL at 05:50

## 2018-10-14 RX ADMIN — GUAIFENESIN AND DEXTROMETHORPHAN PRN ML: 100; 10 SYRUP ORAL at 18:02

## 2018-10-14 RX ADMIN — Medication SCH MG: at 22:41

## 2018-10-14 RX ADMIN — PSEUDOEPHEDRINE HCL AND TRIPROLIDINE HCL PRN COMBO: 60; 2.5 TABLET, FILM COATED ORAL at 18:01

## 2018-10-14 NOTE — PN
[facility] CIWA





- CIWA Score


Nausea/Vomitin


Muscle Tremors: 3


Anxiety: 3


Agitation: 3


Paroxysmal Sweats: 3


Orientation: 0-Oriented


Tacttile Disturbances: 1-Very Mild Itch/Numbness


Auditory Disturbances: 0-None


Visual Disturbances: 0-None


Headache: 1-Very Mild


CIWA-Ar Total Score: 16





[facility] Progress Note (SOAP)


Subjective: 





Stomach cramp, sweating, interrupted sleep


Objective: 





10/14/18 16:49


 Last Vital Signs











Temp Pulse Resp BP Pulse Ox


 


 96.6 F L  70   20   134/82    


 


 10/14/18 14:05  10/14/18 14:05  10/14/18 14:05  10/14/18 14:05   








 Laboratory Tests











  10/12/18 10/13/18 10/13/18





  17:00 08:00 08:00


 


WBC    4.0


 


RBC    4.29


 


Hgb    12.7


 


Hct    38.2


 


MCV    88.9


 


MCH    29.6


 


MCHC    33.3


 


RDW    14.6


 


Plt Count    188  D


 


MPV    8.8


 


Sodium   


 


Potassium   


 


Chloride   


 


Carbon Dioxide   


 


Anion Gap   


 


BUN   


 


Creatinine   


 


Creat Clearance w eGFR   


 


Random Glucose   


 


Calcium   


 


Total Bilirubin   


 


AST   


 


ALT   


 


Alkaline Phosphatase   


 


Total Protein   


 


Albumin   


 


Urine Color  Yellow  


 


Urine Appearance  Clear  


 


Urine pH  7.0  D  


 


Ur Specific Gravity  1.019  


 


Urine Protein  1+ H  


 


Urine Glucose (UA)  Negative  


 


Urine Ketones  Negative  


 


Urine Blood  1+ H  


 


Urine Nitrite  Negative  


 


Urine Bilirubin  Negative  


 


Urine Urobilinogen  2.0  


 


Ur Leukocyte Esterase  Negative  


 


Urine WBC (Auto)  1  


 


Urine RBC (Auto)  2  


 


Ur Epithelial Cells  Rare  


 


Hyaline Casts  3  


 


Urine Mucus  Moderate  


 


RPR Titer   


 


HIV 1&2 Antibody Screen   Negative 


 


HIV P24 Antigen   Negative 














  10/13/18 10/13/18





  08:00 08:00


 


WBC  


 


RBC  


 


Hgb  


 


Hct  


 


MCV  


 


MCH  


 


MCHC  


 


RDW  


 


Plt Count  


 


MPV  


 


Sodium  138 


 


Potassium  3.6 


 


Chloride  102 


 


Carbon Dioxide  26 


 


Anion Gap  9 


 


BUN  7 


 


Creatinine  0.9 


 


Creat Clearance w eGFR  > 60 


 


Random Glucose  84 


 


Calcium  8.9 


 


Total Bilirubin  1.8 H 


 


AST  44 H 


 


ALT  32 


 


Alkaline Phosphatase  99 


 


Total Protein  7.7 


 


Albumin  3.6 


 


Urine Color  


 


Urine Appearance  


 


Urine pH  


 


Ur Specific Gravity  


 


Urine Protein  


 


Urine Glucose (UA)  


 


Urine Ketones  


 


Urine Blood  


 


Urine Nitrite  


 


Urine Bilirubin  


 


Urine Urobilinogen  


 


Ur Leukocyte Esterase  


 


Urine WBC (Auto)  


 


Urine RBC (Auto)  


 


Ur Epithelial Cells  


 


Hyaline Casts  


 


Urine Mucus  


 


RPR Titer   Nonreactive


 


HIV 1&2 Antibody Screen  


 


HIV P24 Antigen  








Labs reviewed: abnormal UA


Assessment: 





10/14/18 16:50


Withdrawal sxs





Noted with abnormal UA


Plan: 





Continue detox





Abnormal UA: encouraged PO water intake, repeat UA

## 2018-10-15 LAB
APPEARANCE UR: CLEAR
BILIRUB UR STRIP.AUTO-MCNC: NEGATIVE MG/DL
COLOR UR: (no result)
KETONES UR QL STRIP: NEGATIVE
LEUKOCYTE ESTERASE UR QL STRIP.AUTO: NEGATIVE
NITRITE UR QL STRIP: NEGATIVE
PH UR: 5 [PH] (ref 5–8)
PROT UR QL STRIP: NEGATIVE
PROT UR QL STRIP: NEGATIVE
SP GR UR: 1.01 (ref 1.01–1.03)
UROBILINOGEN UR STRIP-MCNC: NEGATIVE MG/DL (ref 0.2–1)

## 2018-10-15 RX ADMIN — LOPERAMIDE HYDROCHLORIDE PRN MG: 2 CAPSULE ORAL at 17:35

## 2018-10-15 RX ADMIN — GUAIFENESIN AND DEXTROMETHORPHAN PRN ML: 100; 10 SYRUP ORAL at 22:41

## 2018-10-15 RX ADMIN — PHENOL PRN EACH: 14.5 LOZENGE ORAL at 10:40

## 2018-10-15 RX ADMIN — POLYVINYL ALCOHOL SCH: 14 SOLUTION/ DROPS OPHTHALMIC at 15:11

## 2018-10-15 RX ADMIN — PSEUDOEPHEDRINE HCL AND TRIPROLIDINE HCL PRN COMBO: 60; 2.5 TABLET, FILM COATED ORAL at 05:43

## 2018-10-15 RX ADMIN — PHENOL PRN EACH: 14.5 LOZENGE ORAL at 19:36

## 2018-10-15 RX ADMIN — PHENOL PRN EACH: 14.5 LOZENGE ORAL at 05:39

## 2018-10-15 RX ADMIN — POLYVINYL ALCOHOL SCH DROP: 14 SOLUTION/ DROPS OPHTHALMIC at 22:41

## 2018-10-15 RX ADMIN — GUAIFENESIN AND DEXTROMETHORPHAN PRN ML: 100; 10 SYRUP ORAL at 19:36

## 2018-10-15 RX ADMIN — Medication SCH TAB: at 10:36

## 2018-10-15 RX ADMIN — LOPERAMIDE HYDROCHLORIDE PRN MG: 2 CAPSULE ORAL at 10:36

## 2018-10-15 RX ADMIN — Medication SCH MG: at 22:40

## 2018-10-15 RX ADMIN — POLYVINYL ALCOHOL SCH DROP: 14 SOLUTION/ DROPS OPHTHALMIC at 06:23

## 2018-10-15 NOTE — PN
[facility] Progress Note (SOAP)


Subjective: 





Interrupted sleep, diarrhea


Objective: 





10/15/18 12:54


 Last Vital Signs











Temp Pulse Resp BP Pulse Ox


 


 97.3 F L  63   18   139/80    


 


 10/15/18 09:28  10/15/18 09:28  10/15/18 09:28  10/15/18 09:28   








 Laboratory Tests











  10/12/18 10/13/18 10/13/18





  17:00 08:00 08:00


 


WBC    4.0


 


RBC    4.29


 


Hgb    12.7


 


Hct    38.2


 


MCV    88.9


 


MCH    29.6


 


MCHC    33.3


 


RDW    14.6


 


Plt Count    188  D


 


MPV    8.8


 


Sodium   


 


Potassium   


 


Chloride   


 


Carbon Dioxide   


 


Anion Gap   


 


BUN   


 


Creatinine   


 


Creat Clearance w eGFR   


 


Random Glucose   


 


Calcium   


 


Total Bilirubin   


 


AST   


 


ALT   


 


Alkaline Phosphatase   


 


Total Protein   


 


Albumin   


 


Urine Color  Yellow  


 


Urine Appearance  Clear  


 


Urine pH  7.0  D  


 


Ur Specific Gravity  1.019  


 


Urine Protein  1+ H  


 


Urine Glucose (UA)  Negative  


 


Urine Ketones  Negative  


 


Urine Blood  1+ H  


 


Urine Nitrite  Negative  


 


Urine Bilirubin  Negative  


 


Urine Urobilinogen  2.0  


 


Ur Leukocyte Esterase  Negative  


 


Urine WBC (Auto)  1  


 


Urine RBC (Auto)  2  


 


Ur Epithelial Cells  Rare  


 


Hyaline Casts  3  


 


Urine Mucus  Moderate  


 


RPR Titer   


 


HIV 1&2 Antibody Screen   Negative 


 


HIV P24 Antigen   Negative 














  10/13/18 10/13/18 10/15/18





  08:00 08:00 06:17


 


WBC   


 


RBC   


 


Hgb   


 


Hct   


 


MCV   


 


MCH   


 


MCHC   


 


RDW   


 


Plt Count   


 


MPV   


 


Sodium  138  


 


Potassium  3.6  


 


Chloride  102  


 


Carbon Dioxide  26  


 


Anion Gap  9  


 


BUN  7  


 


Creatinine  0.9  


 


Creat Clearance w eGFR  > 60  


 


Random Glucose  84  


 


Calcium  8.9  


 


Total Bilirubin  1.8 H  


 


AST  44 H  


 


ALT  32  


 


Alkaline Phosphatase  99  


 


Total Protein  7.7  


 


Albumin  3.6  


 


Urine Color    Straw


 


Urine Appearance    Clear


 


Urine pH    5.0  D


 


Ur Specific Gravity    1.009 L


 


Urine Protein    Negative


 


Urine Glucose (UA)    Negative


 


Urine Ketones    Negative


 


Urine Blood    Negative


 


Urine Nitrite    Negative


 


Urine Bilirubin    Negative


 


Urine Urobilinogen    Negative


 


Ur Leukocyte Esterase    Negative


 


Urine WBC (Auto)   


 


Urine RBC (Auto)   


 


Ur Epithelial Cells   


 


Hyaline Casts   


 


Urine Mucus   


 


RPR Titer   Nonreactive 


 


HIV 1&2 Antibody Screen   


 


HIV P24 Antigen   








Labs reviewed: total bilirubin 1.8


Assessment: 





10/15/18 12:57


Withdrawal sxs





Noted with Elevated total bilirubin 


Plan: 





Continue detox





Elevated total bilirubin: follow up on repeated serum total bilirubin (pending 

result)

## 2018-10-16 VITALS — DIASTOLIC BLOOD PRESSURE: 79 MMHG | SYSTOLIC BLOOD PRESSURE: 140 MMHG | TEMPERATURE: 97.7 F | HEART RATE: 51 BPM

## 2018-10-16 RX ADMIN — LOPERAMIDE HYDROCHLORIDE PRN MG: 2 CAPSULE ORAL at 05:21

## 2018-10-16 RX ADMIN — PHENOL PRN EACH: 14.5 LOZENGE ORAL at 05:23

## 2018-10-16 RX ADMIN — PSEUDOEPHEDRINE HCL AND TRIPROLIDINE HCL PRN COMBO: 60; 2.5 TABLET, FILM COATED ORAL at 05:23

## 2018-10-16 RX ADMIN — POLYVINYL ALCOHOL SCH: 14 SOLUTION/ DROPS OPHTHALMIC at 05:22

## 2018-10-16 NOTE — DS
[facility] Detox Discharge Summary


Admission Date: 


10/12/18





Discharge Date: 10/16/18





- History


Present History: Alcohol Dependence, Cannabis Dependence, Sedative Dependence


Pertinent Past History: 





Denies





- Physical Exam Results


Vital Signs: 


 Vital Signs











Temperature  97.7 F   10/16/18 06:34


 


Pulse Rate  51 L  10/16/18 06:34


 


Respiratory Rate  18   10/16/18 06:34


 


Blood Pressure  140/79   10/16/18 06:34


 


O2 Sat by Pulse Oximetry (%)      











Pertinent Admission Physical Exam Findings: 





Withdrawal symptoms





 Laboratory Tests











  10/12/18 10/13/18 10/13/18





  17:00 08:00 08:00


 


WBC    4.0


 


RBC    4.29


 


Hgb    12.7


 


Hct    38.2


 


MCV    88.9


 


MCH    29.6


 


MCHC    33.3


 


RDW    14.6


 


Plt Count    188  D


 


MPV    8.8


 


Sodium   


 


Potassium   


 


Chloride   


 


Carbon Dioxide   


 


Anion Gap   


 


BUN   


 


Creatinine   


 


Creat Clearance w eGFR   


 


Random Glucose   


 


Calcium   


 


Total Bilirubin   


 


AST   


 


ALT   


 


Alkaline Phosphatase   


 


Total Protein   


 


Albumin   


 


Urine Color  Yellow  


 


Urine Appearance  Clear  


 


Urine pH  7.0  D  


 


Ur Specific Gravity  1.019  


 


Urine Protein  1+ H  


 


Urine Glucose (UA)  Negative  


 


Urine Ketones  Negative  


 


Urine Blood  1+ H  


 


Urine Nitrite  Negative  


 


Urine Bilirubin  Negative  


 


Urine Urobilinogen  2.0  


 


Ur Leukocyte Esterase  Negative  


 


Urine WBC (Auto)  1  


 


Urine RBC (Auto)  2  


 


Ur Epithelial Cells  Rare  


 


Hyaline Casts  3  


 


Urine Mucus  Moderate  


 


RPR Titer   


 


HIV 1&2 Antibody Screen   Negative 


 


HIV P24 Antigen   Negative 














  10/13/18 10/13/18 10/15/18





  08:00 08:00 06:17


 


WBC   


 


RBC   


 


Hgb   


 


Hct   


 


MCV   


 


MCH   


 


MCHC   


 


RDW   


 


Plt Count   


 


MPV   


 


Sodium  138  


 


Potassium  3.6  


 


Chloride  102  


 


Carbon Dioxide  26  


 


Anion Gap  9  


 


BUN  7  


 


Creatinine  0.9  


 


Creat Clearance w eGFR  > 60  


 


Random Glucose  84  


 


Calcium  8.9  


 


Total Bilirubin  1.8 H  


 


AST  44 H  


 


ALT  32  


 


Alkaline Phosphatase  99  


 


Total Protein  7.7  


 


Albumin  3.6  


 


Urine Color    Straw


 


Urine Appearance    Clear


 


Urine pH    5.0  D


 


Ur Specific Gravity    1.009 L


 


Urine Protein    Negative


 


Urine Glucose (UA)    Negative


 


Urine Ketones    Negative


 


Urine Blood    Negative


 


Urine Nitrite    Negative


 


Urine Bilirubin    Negative


 


Urine Urobilinogen    Negative


 


Ur Leukocyte Esterase    Negative


 


Urine WBC (Auto)   


 


Urine RBC (Auto)   


 


Ur Epithelial Cells   


 


Hyaline Casts   


 


Urine Mucus   


 


RPR Titer   Nonreactive 


 


HIV 1&2 Antibody Screen   


 


HIV P24 Antigen   














  10/15/18





  09:10


 


WBC 


 


RBC 


 


Hgb 


 


Hct 


 


MCV 


 


MCH 


 


MCHC 


 


RDW 


 


Plt Count 


 


MPV 


 


Sodium 


 


Potassium 


 


Chloride 


 


Carbon Dioxide 


 


Anion Gap 


 


BUN 


 


Creatinine 


 


Creat Clearance w eGFR 


 


Random Glucose 


 


Calcium 


 


Total Bilirubin  0.8


 


AST 


 


ALT 


 


Alkaline Phosphatase 


 


Total Protein 


 


Albumin 


 


Urine Color 


 


Urine Appearance 


 


Urine pH 


 


Ur Specific Gravity 


 


Urine Protein 


 


Urine Glucose (UA) 


 


Urine Ketones 


 


Urine Blood 


 


Urine Nitrite 


 


Urine Bilirubin 


 


Urine Urobilinogen 


 


Ur Leukocyte Esterase 


 


Urine WBC (Auto) 


 


Urine RBC (Auto) 


 


Ur Epithelial Cells 


 


Hyaline Casts 


 


Urine Mucus 


 


RPR Titer 


 


HIV 1&2 Antibody Screen 


 


HIV P24 Antigen 








Labs reviewed 





- Treatment


Hospital Course: Detox Protocol Followed, Detoxed Safely, Responded well, 

Discharged Condition Good





- Medication


Discharge Medications: 


Ambulatory Orders





NK [No Known Home Medication]  12/09/14 











- Diagnosis


(1) Serum total bilirubin elevated


Status: Resolved   





(2) Alcohol dependence with uncomplicated withdrawal


Status: Acute   





(3) Cannabis dependence, uncomplicated


Status: Chronic   





(4) Uncomplicated sedative, hypnotic or anxiolytic withdrawal


Status: Acute   





- AMA


Did Patient Leave Against Medical Advice: No (F/U with your PCP within 1 week)

## 2019-01-26 ENCOUNTER — EMERGENCY (EMERGENCY)
Facility: HOSPITAL | Age: 49
LOS: 1 days | Discharge: HOME | End: 2019-01-26
Attending: EMERGENCY MEDICINE | Admitting: EMERGENCY MEDICINE
Payer: MEDICAID

## 2019-01-26 VITALS
DIASTOLIC BLOOD PRESSURE: 76 MMHG | HEART RATE: 76 BPM | SYSTOLIC BLOOD PRESSURE: 141 MMHG | TEMPERATURE: 99 F | RESPIRATION RATE: 17 BRPM | OXYGEN SATURATION: 96 %

## 2019-01-26 VITALS
RESPIRATION RATE: 16 BRPM | HEART RATE: 91 BPM | SYSTOLIC BLOOD PRESSURE: 168 MMHG | TEMPERATURE: 98 F | DIASTOLIC BLOOD PRESSURE: 94 MMHG | OXYGEN SATURATION: 95 %

## 2019-01-26 PROCEDURE — 99283 EMERGENCY DEPT VISIT LOW MDM: CPT
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
## 2019-01-26 NOTE — ED PROVIDER NOTE - OBJECTIVE STATEMENT
50 yo M BIBA for public EtOH intox.  Pt admits to alcohol intake today.  Denies drug use or other illicits. No acute medical complaints or apparent trauma noted.  Minimally cooperative with hx and ROS due to heavy intox. No bleeding, respiratory distress, pain, vomiting, or urinary/bowel incontinence noted.

## 2019-01-26 NOTE — ED PROVIDER NOTE - CONSTITUTIONAL, MLM
normal... somnolent, arousable to verbal stimuli, oriented to person, place, time/situation and in no apparent distress. +AOB

## 2019-01-26 NOTE — ED ADULT NURSE NOTE - NSIMPLEMENTINTERV_GEN_ALL_ED
Implemented All Fall Risk Interventions:  [place] to call system. Call bell, personal items and telephone within reach. Instruct patient to call for assistance. Room bathroom lighting operational. Non-slip footwear when patient is off stretcher. Physically safe environment: no spills, clutter or unnecessary equipment. Stretcher in lowest position, wheels locked, appropriate side rails in place. Provide visual cue, wrist band, yellow gown, etc. Monitor gait and stability. Monitor for mental status changes and reorient to person, place, and time. Review medications for side effects contributing to fall risk. Reinforce activity limits and safety measures with patient and family.

## 2019-01-30 DIAGNOSIS — R41.82 ALTERED MENTAL STATUS, UNSPECIFIED: ICD-10-CM

## 2019-01-30 DIAGNOSIS — F10.129 ALCOHOL ABUSE WITH INTOXICATION, UNSPECIFIED: ICD-10-CM

## 2019-02-24 ENCOUNTER — HOSPITAL ENCOUNTER (INPATIENT)
Dept: HOSPITAL 74 - YASAS | Age: 49
LOS: 4 days | Discharge: HOME | End: 2019-02-28
Attending: SURGERY | Admitting: SURGERY
Payer: COMMERCIAL

## 2019-02-24 VITALS — BODY MASS INDEX: 27.1 KG/M2

## 2019-02-24 DIAGNOSIS — F10.230: Primary | ICD-10-CM

## 2019-02-24 DIAGNOSIS — B35.3: ICD-10-CM

## 2019-02-24 DIAGNOSIS — F19.24: ICD-10-CM

## 2019-02-24 DIAGNOSIS — K21.9: ICD-10-CM

## 2019-02-24 DIAGNOSIS — F13.230: ICD-10-CM

## 2019-02-24 DIAGNOSIS — R19.7: ICD-10-CM

## 2019-02-24 LAB
APPEARANCE UR: CLEAR
BILIRUB UR STRIP.AUTO-MCNC: NEGATIVE MG/DL
COLOR UR: YELLOW
EPITH CASTS URNS QL MICRO: (no result) /HPF
KETONES UR QL STRIP: (no result)
LEUKOCYTE ESTERASE UR QL STRIP.AUTO: NEGATIVE
MUCOUS THREADS URNS QL MICRO: (no result)
NITRITE UR QL STRIP: NEGATIVE
PH UR: 5 [PH] (ref 5–8)
PROT UR QL STRIP: (no result)
PROT UR QL STRIP: NEGATIVE
SP GR UR: 1.03 (ref 1.01–1.03)
UROBILINOGEN UR STRIP-MCNC: NEGATIVE MG/DL (ref 0.2–1)

## 2019-02-24 PROCEDURE — HZ2ZZZZ DETOXIFICATION SERVICES FOR SUBSTANCE ABUSE TREATMENT: ICD-10-PCS | Performed by: INTERNAL MEDICINE

## 2019-02-24 RX ADMIN — Medication SCH MG: at 22:21

## 2019-02-24 RX ADMIN — ALUMINUM HYDROXIDE, MAGNESIUM HYDROXIDE, AND SIMETHICONE PRN ML: 200; 200; 20 SUSPENSION ORAL at 17:19

## 2019-02-24 RX ADMIN — ALUMINUM HYDROXIDE, MAGNESIUM HYDROXIDE, AND SIMETHICONE PRN ML: 200; 200; 20 SUSPENSION ORAL at 23:15

## 2019-02-24 RX ADMIN — Medication PRN MG: at 22:23
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
## 2019-02-25 LAB
ALBUMIN SERPL-MCNC: 4.4 G/DL (ref 3.4–5)
ALP SERPL-CCNC: 94 U/L (ref 45–117)
ALT SERPL-CCNC: 89 U/L (ref 13–61)
ANION GAP SERPL CALC-SCNC: 9 MMOL/L (ref 8–16)
AST SERPL-CCNC: 96 U/L (ref 15–37)
BILIRUB SERPL-MCNC: 2.1 MG/DL (ref 0.2–1)
BUN SERPL-MCNC: 13 MG/DL (ref 7–18)
CALCIUM SERPL-MCNC: 9.7 MG/DL (ref 8.5–10.1)
CHLORIDE SERPL-SCNC: 101 MMOL/L (ref 98–107)
CO2 SERPL-SCNC: 25 MMOL/L (ref 21–32)
CREAT SERPL-MCNC: 1 MG/DL (ref 0.55–1.3)
DEPRECATED RDW RBC AUTO: 15 % (ref 11.9–15.9)
GLUCOSE SERPL-MCNC: 108 MG/DL (ref 74–106)
HCT VFR BLD CALC: 40.3 % (ref 35.4–49)
HGB BLD-MCNC: 13.6 GM/DL (ref 11.7–16.9)
MCH RBC QN AUTO: 30.2 PG (ref 25.7–33.7)
MCHC RBC AUTO-ENTMCNC: 33.7 G/DL (ref 32–35.9)
MCV RBC: 89.6 FL (ref 80–96)
PLATELET # BLD AUTO: 212 K/MM3 (ref 134–434)
PMV BLD: 8.4 FL (ref 7.5–11.1)
POTASSIUM SERPLBLD-SCNC: 3.8 MMOL/L (ref 3.5–5.1)
PROT SERPL-MCNC: 8.6 G/DL (ref 6.4–8.2)
RBC # BLD AUTO: 4.5 M/MM3 (ref 4–5.6)
SODIUM SERPL-SCNC: 135 MMOL/L (ref 136–145)
WBC # BLD AUTO: 3.4 K/MM3 (ref 4–10)

## 2019-02-25 RX ADMIN — SIMETHICONE CHEW TAB 80 MG SCH MG: 80 TABLET ORAL at 17:35

## 2019-02-25 RX ADMIN — CLOTRIMAZOLE SCH: 1 CREAM TOPICAL at 22:32

## 2019-02-25 RX ADMIN — CLOTRIMAZOLE SCH APPLIC: 1 CREAM TOPICAL at 13:17

## 2019-02-25 RX ADMIN — RANITIDINE SCH MG: 150 TABLET ORAL at 22:33

## 2019-02-25 RX ADMIN — SIMETHICONE CHEW TAB 80 MG SCH MG: 80 TABLET ORAL at 22:33

## 2019-02-25 RX ADMIN — RANITIDINE SCH MG: 150 TABLET ORAL at 10:25

## 2019-02-25 RX ADMIN — Medication SCH MG: at 22:33

## 2019-02-25 RX ADMIN — SIMETHICONE CHEW TAB 80 MG SCH: 80 TABLET ORAL at 13:18

## 2019-02-25 RX ADMIN — POLYVINYL ALCOHOL SCH DROP: 14 SOLUTION/ DROPS OPHTHALMIC at 17:35

## 2019-02-25 RX ADMIN — SIMETHICONE CHEW TAB 80 MG SCH MG: 80 TABLET ORAL at 13:14

## 2019-02-25 RX ADMIN — Medication SCH TAB: at 10:23

## 2019-02-25 RX ADMIN — POLYVINYL ALCOHOL SCH DROP: 14 SOLUTION/ DROPS OPHTHALMIC at 22:32

## 2019-02-25 RX ADMIN — Medication PRN MG: at 22:33

## 2019-02-25 NOTE — PN
[facility] CIWA





- CIWA Score


Nausea/Vomitin-Mild Nausea/No Vomiting


Muscle Tremors: 2


Anxiety: 3


Agitation: 3


Paroxysmal Sweats: No Perspiration


Orientation: 1-Uncertain about Date


Tacttile Disturbances: 0-None


Auditory Disturbances: 0-None


Visual Disturbances: 0-None


Headache: 1-Very Mild


CIWA-Ar Total Score: 11





[facility] Progress Note (SOAP)


Subjective: 





nausea acid reflux loose stool


anxiety restlessness trouble sleep at night tremor


Objective: 





19 09:28


 Vital Signs











Temperature  97.7 F   19 06:26


 


Pulse Rate  59 L  19 06:26


 


Respiratory Rate  18   19 06:26


 


Blood Pressure  139/78   19 06:26


 


O2 Sat by Pulse Oximetry (%)      








 Laboratory Last Values











Urine Color  Yellow   19  14:12    


 


Urine Appearance  Clear   19  14:12    


 


Urine pH  5.0  (5.0-8.0)   19  14:12    


 


Ur Specific Gravity  1.029  (1.010-1.035)   19  14:12    


 


Urine Protein  1+  (NEGATIVE)  H  19  14:12    


 


Urine Glucose (UA)  Negative  (NEGATIVE)   19  14:12    


 


Urine Ketones  2+  (NEGATIVE)  H  19  14:12    


 


Urine Blood  2+  (NEGATIVE)  H  19  14:12    


 


Urine Nitrite  Negative  (NEGATIVE)   19  14:12    


 


Urine Bilirubin  Negative  (<2.0 mg/dL)   19  14:12    


 


Urine Urobilinogen  Negative mg/dL (0.2-1.0)   19  14:12    


 


Ur Leukocyte Esterase  Negative  (NEGATIVE)   19  14:12    


 


Urine WBC (Auto)  1 /hpf (3-5)   19  14:12    


 


Urine RBC (Auto)  3 /hpf (0-3)   19  14:12    


 


Ur Epithelial Cells  Rare /HPF (FEW)   19  14:12    


 


Urine Mucus  Rare   19  14:12    








lab noted


Assessment: 





19 09:29


alcohol and benzo withdrawal sx


19 09:30


acid reflux


mild diarrhea 


gassy fungal feet


Plan: 





continue detox


gas x


zantac


discontinue motrin


clotrimazole to feet

## 2019-02-26 RX ADMIN — POLYVINYL ALCOHOL SCH DROP: 14 SOLUTION/ DROPS OPHTHALMIC at 22:27

## 2019-02-26 RX ADMIN — SIMETHICONE CHEW TAB 80 MG SCH MG: 80 TABLET ORAL at 14:10

## 2019-02-26 RX ADMIN — CLOTRIMAZOLE SCH APPLIC: 1 CREAM TOPICAL at 22:27

## 2019-02-26 RX ADMIN — Medication SCH TAB: at 10:28

## 2019-02-26 RX ADMIN — POLYVINYL ALCOHOL SCH DROP: 14 SOLUTION/ DROPS OPHTHALMIC at 17:45

## 2019-02-26 RX ADMIN — POLYVINYL ALCOHOL SCH DROP: 14 SOLUTION/ DROPS OPHTHALMIC at 10:27

## 2019-02-26 RX ADMIN — RANITIDINE SCH MG: 150 TABLET ORAL at 10:28

## 2019-02-26 RX ADMIN — SIMETHICONE CHEW TAB 80 MG SCH MG: 80 TABLET ORAL at 10:28

## 2019-02-26 RX ADMIN — ALUMINUM HYDROXIDE, MAGNESIUM HYDROXIDE, AND SIMETHICONE PRN ML: 200; 200; 20 SUSPENSION ORAL at 05:22

## 2019-02-26 RX ADMIN — CLOTRIMAZOLE SCH APPLIC: 1 CREAM TOPICAL at 10:28

## 2019-02-26 RX ADMIN — SIMETHICONE CHEW TAB 80 MG SCH MG: 80 TABLET ORAL at 17:45

## 2019-02-26 RX ADMIN — Medication SCH MG: at 22:27

## 2019-02-26 RX ADMIN — RANITIDINE SCH MG: 150 TABLET ORAL at 22:27

## 2019-02-26 RX ADMIN — SIMETHICONE CHEW TAB 80 MG SCH: 80 TABLET ORAL at 23:06

## 2019-02-26 RX ADMIN — POLYVINYL ALCOHOL SCH DROP: 14 SOLUTION/ DROPS OPHTHALMIC at 14:10

## 2019-02-26 NOTE — PN
S CIWA





- CIWA Score


Nausea/Vomitin-Mild Nausea/No Vomiting


Muscle Tremors: 2


Anxiety: 2


Agitation: 2


Paroxysmal Sweats: 1-Minimal Palms Moist


Orientation: 0-Oriented


Tacttile Disturbances: 0-None


Auditory Disturbances: 0-None


Visual Disturbances: 0-None


Headache: 1-Very Mild


CIWA-Ar Total Score: 9





[facility] Progress Note (SOAP)


Subjective: 





tremor indigestion sweating anxiety 


Objective: 





19 11:36


 Vital Signs











Temperature  97.4 F L  19 09:58


 


Pulse Rate  77   19 09:58


 


Respiratory Rate  20   19 09:58


 


Blood Pressure  135/81   19 09:58


 


O2 Sat by Pulse Oximetry (%)      








 Laboratory Last Values











WBC  3.4 K/mm3 (4.0-10.0)  L  19  07:40    


 


RBC  4.50 M/mm3 (4.00-5.60)   19  07:40    


 


Hgb  13.6 GM/dL (11.7-16.9)   19  07:40    


 


Hct  40.3 % (35.4-49)   19  07:40    


 


MCV  89.6 fl (80-96)   19  07:40    


 


MCH  30.2 pg (25.7-33.7)   19  07:40    


 


MCHC  33.7 g/dl (32.0-35.9)   19  07:40    


 


RDW  15.0 % (11.9-15.9)   19  07:40    


 


Plt Count  212 K/MM3 (134-434)   19  07:40    


 


MPV  8.4 fl (7.5-11.1)   19  07:40    


 


Sodium  135 mmol/L (136-145)  L  19  07:40    


 


Potassium  3.8 mmol/L (3.5-5.1)   19  07:40    


 


Chloride  101 mmol/L ()   19  07:40    


 


Carbon Dioxide  25 mmol/L (21-32)   19  07:40    


 


Anion Gap  9 MMOL/L (8-16)   19  07:40    


 


BUN  13 mg/dL (7-18)   19  07:40    


 


Creatinine  1.0 mg/dL (0.55-1.3)   19  07:40    


 


Creat Clearance w eGFR  > 60  (>60)   19  07:40    


 


Random Glucose  108 mg/dL ()  H  19  07:40    


 


Calcium  9.7 mg/dL (8.5-10.1)   19  07:40    


 


Total Bilirubin  2.1 mg/dL (0.2-1)  H  19  07:40    


 


AST  96 U/L (15-37)  H  19  07:40    


 


ALT  89 U/L (13-61)  H  19  07:40    


 


Alkaline Phosphatase  94 U/L ()   19  07:40    


 


Total Protein  8.6 g/dl (6.4-8.2)  H  19  07:40    


 


Albumin  4.4 g/dl (3.4-5.0)   19  07:40    


 


Urine Color  Yellow   19  14:12    


 


Urine Appearance  Clear   19  14:12    


 


Urine pH  5.0  (5.0-8.0)   19  14:12    


 


Ur Specific Gravity  1.029  (1.010-1.035)   19  14:12    


 


Urine Protein  1+  (NEGATIVE)  H  19  14:12    


 


Urine Glucose (UA)  Negative  (NEGATIVE)   19  14:12    


 


Urine Ketones  2+  (NEGATIVE)  H  19  14:12    


 


Urine Blood  2+  (NEGATIVE)  H  19  14:12    


 


Urine Nitrite  Negative  (NEGATIVE)   19  14:12    


 


Urine Bilirubin  Negative  (<2.0 mg/dL)   19  14:12    


 


Urine Urobilinogen  Negative mg/dL (0.2-1.0)   19  14:12    


 


Ur Leukocyte Esterase  Negative  (NEGATIVE)   19  14:12    


 


Urine WBC (Auto)  1 /hpf (3-5)   19  14:12    


 


Urine RBC (Auto)  3 /hpf (0-3)   19  14:12    


 


Ur Epithelial Cells  Rare /HPF (FEW)   19  14:12    


 


Urine Mucus  Rare   19  14:12    


 


RPR Titer  Nonreactive  (NONREACTIVE)   19  07:40    


 


HIV 1&2 Antibody Screen  Negative   19  07:40    


 


HIV P24 Antigen  Negative   19  07:40    








lab noted


Assessment: 





19 11:36


alcohol and benzo withdrawal sx


Plan: 





continue detox

## 2019-02-27 RX ADMIN — RANITIDINE SCH MG: 150 TABLET ORAL at 10:21

## 2019-02-27 RX ADMIN — POLYVINYL ALCOHOL SCH DROP: 14 SOLUTION/ DROPS OPHTHALMIC at 17:34

## 2019-02-27 RX ADMIN — CLOTRIMAZOLE SCH APPLIC: 1 CREAM TOPICAL at 10:22

## 2019-02-27 RX ADMIN — Medication SCH TAB: at 10:21

## 2019-02-27 RX ADMIN — RANITIDINE SCH MG: 150 TABLET ORAL at 22:11

## 2019-02-27 RX ADMIN — POLYVINYL ALCOHOL SCH DROP: 14 SOLUTION/ DROPS OPHTHALMIC at 14:26

## 2019-02-27 RX ADMIN — POLYVINYL ALCOHOL SCH DROP: 14 SOLUTION/ DROPS OPHTHALMIC at 22:10

## 2019-02-27 RX ADMIN — CLOTRIMAZOLE SCH APPLIC: 1 CREAM TOPICAL at 22:11

## 2019-02-27 RX ADMIN — Medication SCH MG: at 22:12

## 2019-02-27 RX ADMIN — POLYVINYL ALCOHOL SCH DROP: 14 SOLUTION/ DROPS OPHTHALMIC at 10:23

## 2019-02-27 NOTE — PN
S CIWA





- CIWA Score


Nausea/Vomitin-No Nausea/No Vomiting


Muscle Tremors: 1-None Visible, but Felt


Anxiety: 1-Mildly Anxious


Agitation: 1-Slight > Activity


Paroxysmal Sweats: 1-Minimal Palms Moist


Orientation: 0-Oriented


Tacttile Disturbances: 0-None


Auditory Disturbances: 0-None


Visual Disturbances: 0-None


Headache: 0-None Present


CIWA-Ar Total Score: 4





[facility] Progress Note (SOAP)


Subjective: 





feeling better less anxiety mild tremor little sweating able to social with 

peers in day room


Objective: 





19 10:44


 Vital Signs











Temperature  98 F   19 09:17


 


Pulse Rate  78   19 09:17


 


Respiratory Rate  20   19 09:17


 


Blood Pressure  126/77   19 09:17


 


O2 Sat by Pulse Oximetry (%)      








 Laboratory Last Values











WBC  3.4 K/mm3 (4.0-10.0)  L  19  07:40    


 


RBC  4.50 M/mm3 (4.00-5.60)   19  07:40    


 


Hgb  13.6 GM/dL (11.7-16.9)   19  07:40    


 


Hct  40.3 % (35.4-49)   19  07:40    


 


MCV  89.6 fl (80-96)   19  07:40    


 


MCH  30.2 pg (25.7-33.7)   19  07:40    


 


MCHC  33.7 g/dl (32.0-35.9)   19  07:40    


 


RDW  15.0 % (11.9-15.9)   19  07:40    


 


Plt Count  212 K/MM3 (134-434)   19  07:40    


 


MPV  8.4 fl (7.5-11.1)   19  07:40    


 


Sodium  135 mmol/L (136-145)  L  19  07:40    


 


Potassium  3.8 mmol/L (3.5-5.1)   19  07:40    


 


Chloride  101 mmol/L ()   19  07:40    


 


Carbon Dioxide  25 mmol/L (21-32)   19  07:40    


 


Anion Gap  9 MMOL/L (8-16)   19  07:40    


 


BUN  13 mg/dL (7-18)   19  07:40    


 


Creatinine  1.0 mg/dL (0.55-1.3)   19  07:40    


 


Creat Clearance w eGFR  > 60  (>60)   19  07:40    


 


Random Glucose  108 mg/dL ()  H  19  07:40    


 


Calcium  9.7 mg/dL (8.5-10.1)   19  07:40    


 


Total Bilirubin  2.1 mg/dL (0.2-1)  H  19  07:40    


 


AST  96 U/L (15-37)  H  19  07:40    


 


ALT  89 U/L (13-61)  H  19  07:40    


 


Alkaline Phosphatase  94 U/L ()   19  07:40    


 


Total Protein  8.6 g/dl (6.4-8.2)  H  19  07:40    


 


Albumin  4.4 g/dl (3.4-5.0)   19  07:40    


 


Urine Color  Yellow   19  14:12    


 


Urine Appearance  Clear   19  14:12    


 


Urine pH  5.0  (5.0-8.0)   19  14:12    


 


Ur Specific Gravity  1.029  (1.010-1.035)   19  14:12    


 


Urine Protein  1+  (NEGATIVE)  H  19  14:12    


 


Urine Glucose (UA)  Negative  (NEGATIVE)   19  14:12    


 


Urine Ketones  2+  (NEGATIVE)  H  19  14:12    


 


Urine Blood  2+  (NEGATIVE)  H  19  14:12    


 


Urine Nitrite  Negative  (NEGATIVE)   19  14:12    


 


Urine Bilirubin  Negative  (<2.0 mg/dL)   19  14:12    


 


Urine Urobilinogen  Negative mg/dL (0.2-1.0)   19  14:12    


 


Ur Leukocyte Esterase  Negative  (NEGATIVE)   19  14:12    


 


Urine WBC (Auto)  1 /hpf (3-5)   19  14:12    


 


Urine RBC (Auto)  3 /hpf (0-3)   19  14:12    


 


Ur Epithelial Cells  Rare /HPF (FEW)   19  14:12    


 


Urine Mucus  Rare   19  14:12    


 


RPR Titer  Nonreactive  (NONREACTIVE)   19  07:40    


 


HIV 1&2 Antibody Screen  Negative   19  07:40    


 


HIV P24 Antigen  Negative   19  07:40    








lab noted


Assessment: 





19 10:45


mild alcohol and benzo withdrawal sx


Plan: 





continue detox

## 2019-02-28 VITALS — TEMPERATURE: 96.7 F | SYSTOLIC BLOOD PRESSURE: 132 MMHG | DIASTOLIC BLOOD PRESSURE: 86 MMHG | HEART RATE: 78 BPM

## 2019-02-28 NOTE — DS
[facility] Detox Discharge Summary


Admission Date: 


02/24/19





Discharge Date: 02/28/19





- History


Present History: Alcohol Dependence, Sedative Dependence


Additional Comments: 





49 years old male admitted on 02/24/19 for alcohol and benzo withdrawal 

stabilization


completed detox regimen aftercare [facility] / [facility]





patient was doing well throughout the detox hospitalization


acknowledge his chronic elevated liver enzyme as well as alcohol related liver 

insults





Pertinent Past History: 





keep medication list in wallet


bring-in medication and bottles of medication to aftercare appointment


update medication list when change of medication





- Physical Exam Results


Vital Signs: 


 Vital Signs











Temperature  96.7 F L  02/28/19 09:00


 


Pulse Rate  78   02/28/19 09:00


 


Respiratory Rate  18   02/28/19 09:00


 


Blood Pressure  132/86   02/28/19 09:00


 


O2 Sat by Pulse Oximetry (%)      











Pertinent Admission Physical Exam Findings: 





alcohol and benzo withdrawal sx


 Laboratory Last Values











WBC  3.4 K/mm3 (4.0-10.0)  L  02/25/19  07:40    


 


RBC  4.50 M/mm3 (4.00-5.60)   02/25/19  07:40    


 


Hgb  13.6 GM/dL (11.7-16.9)   02/25/19  07:40    


 


Hct  40.3 % (35.4-49)   02/25/19  07:40    


 


MCV  89.6 fl (80-96)   02/25/19  07:40    


 


MCH  30.2 pg (25.7-33.7)   02/25/19  07:40    


 


MCHC  33.7 g/dl (32.0-35.9)   02/25/19  07:40    


 


RDW  15.0 % (11.9-15.9)   02/25/19  07:40    


 


Plt Count  212 K/MM3 (134-434)   02/25/19  07:40    


 


MPV  8.4 fl (7.5-11.1)   02/25/19  07:40    


 


Sodium  135 mmol/L (136-145)  L  02/25/19  07:40    


 


Potassium  3.8 mmol/L (3.5-5.1)   02/25/19  07:40    


 


Chloride  101 mmol/L ()   02/25/19  07:40    


 


Carbon Dioxide  25 mmol/L (21-32)   02/25/19  07:40    


 


Anion Gap  9 MMOL/L (8-16)   02/25/19  07:40    


 


BUN  13 mg/dL (7-18)   02/25/19  07:40    


 


Creatinine  1.0 mg/dL (0.55-1.3)   02/25/19  07:40    


 


Creat Clearance w eGFR  > 60  (>60)   02/25/19  07:40    


 


Random Glucose  108 mg/dL ()  H  02/25/19  07:40    


 


Calcium  9.7 mg/dL (8.5-10.1)   02/25/19  07:40    


 


Total Bilirubin  2.1 mg/dL (0.2-1)  H  02/25/19  07:40    


 


AST  96 U/L (15-37)  H  02/25/19  07:40    


 


ALT  89 U/L (13-61)  H  02/25/19  07:40    


 


Alkaline Phosphatase  94 U/L ()   02/25/19  07:40    


 


Total Protein  8.6 g/dl (6.4-8.2)  H  02/25/19  07:40    


 


Albumin  4.4 g/dl (3.4-5.0)   02/25/19  07:40    


 


Urine Color  Yellow   02/24/19  14:12    


 


Urine Appearance  Clear   02/24/19  14:12    


 


Urine pH  5.0  (5.0-8.0)   02/24/19  14:12    


 


Ur Specific Gravity  1.029  (1.010-1.035)   02/24/19  14:12    


 


Urine Protein  1+  (NEGATIVE)  H  02/24/19  14:12    


 


Urine Glucose (UA)  Negative  (NEGATIVE)   02/24/19  14:12    


 


Urine Ketones  2+  (NEGATIVE)  H  02/24/19  14:12    


 


Urine Blood  2+  (NEGATIVE)  H  02/24/19  14:12    


 


Urine Nitrite  Negative  (NEGATIVE)   02/24/19  14:12    


 


Urine Bilirubin  Negative  (<2.0 mg/dL)   02/24/19  14:12    


 


Urine Urobilinogen  Negative mg/dL (0.2-1.0)   02/24/19  14:12    


 


Ur Leukocyte Esterase  Negative  (NEGATIVE)   02/24/19  14:12    


 


Urine WBC (Auto)  1 /hpf (3-5)   02/24/19  14:12    


 


Urine RBC (Auto)  3 /hpf (0-3)   02/24/19  14:12    


 


Ur Epithelial Cells  Rare /HPF (FEW)   02/24/19  14:12    


 


Urine Mucus  Rare   02/24/19  14:12    


 


RPR Titer  Nonreactive  (NONREACTIVE)   02/25/19  07:40    


 


HIV 1&2 Antibody Screen  Negative   02/25/19  07:40    


 


HIV P24 Antigen  Negative   02/25/19  07:40    








lab noted


patient agrees to follow up with his primary care provider for his liver enzyme 

serum level





- Treatment


Hospital Course: Detox Protocol Followed, Detoxed Safely, Responded well, 

Discharged Condition Good, Rehab Referral Accepted


Patient has Accepted a Rehab Referral to: [facility]





- Medication


Discharge Medications: 


Ambulatory Orders





NK [No Known Home Medication]  02/24/19 











- Diagnosis


(1) Alcohol dependence with uncomplicated withdrawal


Current Visit: Yes   Status: Acute   





(2) Uncomplicated sedative, hypnotic or anxiolytic withdrawal


Current Visit: Yes   Status: Acute   





(3) Drug-induced mood disorder


Current Visit: Yes   Status: Suspected   





- AMA


Did Patient Leave Against Medical Advice: No

## 2019-06-13 ENCOUNTER — HOSPITAL ENCOUNTER (INPATIENT)
Dept: HOSPITAL 74 - YASAS | Age: 49
LOS: 4 days | Discharge: HOME | End: 2019-06-17
Attending: SURGERY | Admitting: SURGERY
Payer: COMMERCIAL

## 2019-06-13 DIAGNOSIS — R74.0: ICD-10-CM

## 2019-06-13 DIAGNOSIS — Z93.0: ICD-10-CM

## 2019-06-13 DIAGNOSIS — F12.20: ICD-10-CM

## 2019-06-13 DIAGNOSIS — F10.230: Primary | ICD-10-CM

## 2019-06-13 DIAGNOSIS — D72.819: ICD-10-CM

## 2019-06-13 DIAGNOSIS — R63.4: ICD-10-CM

## 2019-06-13 DIAGNOSIS — F13.230: ICD-10-CM

## 2019-06-13 DIAGNOSIS — D64.9: ICD-10-CM

## 2019-06-13 LAB
APPEARANCE UR: CLEAR
BILIRUB UR STRIP.AUTO-MCNC: NEGATIVE MG/DL
COLOR UR: YELLOW
KETONES UR QL STRIP: (no result)
LEUKOCYTE ESTERASE UR QL STRIP.AUTO: NEGATIVE
NITRITE UR QL STRIP: NEGATIVE
PH UR: 7.5 [PH] (ref 5–8)
PROT UR QL STRIP: NEGATIVE
PROT UR QL STRIP: NEGATIVE
SP GR UR: 1.06 (ref 1.01–1.03)
UROBILINOGEN UR STRIP-MCNC: 1 MG/DL (ref 0.2–1)

## 2019-06-13 PROCEDURE — HZ2ZZZZ DETOXIFICATION SERVICES FOR SUBSTANCE ABUSE TREATMENT: ICD-10-PCS | Performed by: SURGERY

## 2019-06-13 RX ADMIN — Medication SCH: at 22:52

## 2019-06-13 RX ADMIN — Medication SCH: at 22:45

## 2019-06-13 RX ADMIN — Medication SCH APPLIC: at 13:08
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
## 2019-06-14 LAB
ALBUMIN SERPL-MCNC: 3.6 G/DL (ref 3.4–5)
ALP SERPL-CCNC: 79 U/L (ref 45–117)
ALT SERPL-CCNC: 49 U/L (ref 13–61)
ANION GAP SERPL CALC-SCNC: 6 MMOL/L (ref 8–16)
AST SERPL-CCNC: 60 U/L (ref 15–37)
BILIRUB SERPL-MCNC: 1.1 MG/DL (ref 0.2–1)
BUN SERPL-MCNC: 9.4 MG/DL (ref 7–18)
CALCIUM SERPL-MCNC: 8.4 MG/DL (ref 8.5–10.1)
CHLORIDE SERPL-SCNC: 104 MMOL/L (ref 98–107)
CO2 SERPL-SCNC: 28 MMOL/L (ref 21–32)
CREAT SERPL-MCNC: 0.9 MG/DL (ref 0.55–1.3)
DEPRECATED RDW RBC AUTO: 14.4 % (ref 11.9–15.9)
GLUCOSE SERPL-MCNC: 95 MG/DL (ref 74–106)
HCT VFR BLD CALC: 34.7 % (ref 35.4–49)
HGB BLD-MCNC: 11.5 GM/DL (ref 11.7–16.9)
MCH RBC QN AUTO: 29.7 PG (ref 25.7–33.7)
MCHC RBC AUTO-ENTMCNC: 33 G/DL (ref 32–35.9)
MCV RBC: 90 FL (ref 80–96)
PLATELET # BLD AUTO: 161 K/MM3 (ref 134–434)
PMV BLD: 8.6 FL (ref 7.5–11.1)
POTASSIUM SERPLBLD-SCNC: 3.5 MMOL/L (ref 3.5–5.1)
PROT SERPL-MCNC: 7.3 G/DL (ref 6.4–8.2)
RBC # BLD AUTO: 3.86 M/MM3 (ref 4–5.6)
SODIUM SERPL-SCNC: 139 MMOL/L (ref 136–145)
WBC # BLD AUTO: 2.8 K/MM3 (ref 4–10)

## 2019-06-14 RX ADMIN — LOPERAMIDE HYDROCHLORIDE PRN MG: 2 CAPSULE ORAL at 17:36

## 2019-06-14 RX ADMIN — Medication SCH MG: at 22:06

## 2019-06-14 RX ADMIN — Medication SCH: at 10:20

## 2019-06-14 RX ADMIN — Medication PRN MG: at 22:06

## 2019-06-14 RX ADMIN — Medication SCH: at 22:08

## 2019-06-14 RX ADMIN — Medication SCH TAB: at 10:20

## 2019-06-14 NOTE — PN
S CIWA





- CIWA Score


Nausea/Vomitin


Muscle Tremors: 2


Anxiety: 3


Agitation: 1-Slight > Activity


Paroxysmal Sweats: 3


Orientation: 0-Oriented


Tacttile Disturbances: 2-Mild Itch/Numbness/Burn


Auditory Disturbances: 1-Very Mild


Visual Disturbances: 0-None


Headache: 0-None Present


CIWA-Ar Total Score: 17





[facility] Progress Note (SOAP)


Subjective: 





Tremors, Vomiting, Diarrhea.


Objective: 


PATIENT A & O X 3, OBSERVED AMBULATING ON UNIT UNASSISTED. IN NO ACUTE DISTRESS.





19 15:18


 Vital Signs











Temperature  96.9 F L  19 14:07


 


Pulse Rate  76   19 14:07


 


Respiratory Rate  18   19 14:07


 


Blood Pressure  145/80   19 14:07


 


O2 Sat by Pulse Oximetry (%)      








 Laboratory Tests











  19





  13:00 07:00 07:00


 


WBC    2.8 L


 


RBC    3.86 L


 


Hgb    11.5 L


 


Hct    34.7 L


 


MCV    90.0


 


MCH    29.7


 


MCHC    33.0


 


RDW    14.4


 


Plt Count    161  D


 


MPV    8.6


 


Sodium   


 


Potassium   


 


Chloride   


 


Carbon Dioxide   


 


Anion Gap   


 


BUN   


 


Creatinine   


 


Est GFR (CKD-EPI)AfAm   


 


Est GFR (CKD-EPI)NonAf   


 


Random Glucose   


 


Calcium   


 


Total Bilirubin   


 


AST   


 


ALT   


 


Alkaline Phosphatase   


 


Total Protein   


 


Albumin   


 


Urine Color  Yellow  


 


Urine Appearance  Clear  


 


Urine pH  7.5  D  


 


Ur Specific Gravity  1.057 H  


 


Urine Protein  Negative  


 


Urine Glucose (UA)  Negative  


 


Urine Ketones  Trace H  


 


Urine Blood  Negative  


 


Urine Nitrite  Negative  


 


Urine Bilirubin  Negative  


 


Urine Urobilinogen  1.0  


 


Ur Leukocyte Esterase  Negative  


 


RPR Titer   


 


HIV 1&2 Antibody Screen   Negative 


 


HIV P24 Antigen   Negative 














  19





  07:00 07:00


 


WBC  


 


RBC  


 


Hgb  


 


Hct  


 


MCV  


 


MCH  


 


MCHC  


 


RDW  


 


Plt Count  


 


MPV  


 


Sodium  139 


 


Potassium  3.5 


 


Chloride  104 


 


Carbon Dioxide  28 


 


Anion Gap  6 L 


 


BUN  9.4 


 


Creatinine  0.9 


 


Est GFR (CKD-EPI)AfAm  114.21 


 


Est GFR (CKD-EPI)NonAf  98.54 


 


Random Glucose  95 


 


Calcium  8.4 L 


 


Total Bilirubin  1.1 H 


 


AST  60 H 


 


ALT  49 


 


Alkaline Phosphatase  79 


 


Total Protein  7.3 


 


Albumin  3.6 


 


Urine Color  


 


Urine Appearance  


 


Urine pH  


 


Ur Specific Gravity  


 


Urine Protein  


 


Urine Glucose (UA)  


 


Urine Ketones  


 


Urine Blood  


 


Urine Nitrite  


 


Urine Bilirubin  


 


Urine Urobilinogen  


 


Ur Leukocyte Esterase  


 


RPR Titer   Nonreactive


 


HIV 1&2 Antibody Screen  


 


HIV P24 Antigen  








LABS NOTED.


PATIENT HAS HAD LOW WBC LEVELS ON PREVIOUS ADMISSIONS.





19 15:20





Assessment: 





19 15:19


WITHDRAWAL SYMPTOMS.


ELEVATED AST LEVEL.


ANEMIA.


LEUKOPENIA.





19 15:20





Plan: 





CONTINUE DETOX.


INCREASE DAILY PO FLUID / WATER INTAKE.


PATIENT REPORTS POOR EFFECT FROM PRN PEPTO-BISMOL PO FOR DIARRHEA; CHANGE TO 

PRN IMODIUM PO.


PRN ZOFRAN SL FOR NAUSEA/VOMITING.


PATIENT IS CURRENTLY RECEIVING DAILY MVI CONTAINING B VITAMINS AND IRON WHILE 

ADMITTED FOR DETOX.

## 2019-06-15 RX ADMIN — LOPERAMIDE HYDROCHLORIDE PRN MG: 2 CAPSULE ORAL at 10:25

## 2019-06-15 RX ADMIN — Medication SCH APPLIC: at 11:28

## 2019-06-15 RX ADMIN — Medication SCH: at 22:46

## 2019-06-15 RX ADMIN — Medication SCH MG: at 21:57

## 2019-06-15 RX ADMIN — LOPERAMIDE HYDROCHLORIDE PRN MG: 2 CAPSULE ORAL at 21:56

## 2019-06-15 RX ADMIN — Medication SCH TAB: at 10:24

## 2019-06-15 RX ADMIN — Medication PRN MG: at 21:57

## 2019-06-15 NOTE — PN
S CIWA





- CIWA Score


Nausea/Vomitin-No Nausea/No Vomiting


Muscle Tremors: 2


Anxiety: 2


Agitation: 2


Paroxysmal Sweats: 2


Orientation: 0-Oriented


Tacttile Disturbances: 0-None


Auditory Disturbances: 0-None


Visual Disturbances: 0-None


Headache: 2-Mild


CIWA-Ar Total Score: 10





[facility] Progress Note (SOAP)


Subjective: 





c/o interrupted sleep, headache, shakes, and anxiety.


Objective: 





06/15/19 12:36


 Vital Signs











  06/15/19 06/15/19





  06:21 09:40


 


Temperature 97.4 F L 97.6 F


 


Pulse Rate 56 L 73


 


Respiratory 18 18





Rate  


 


Blood Pressure 139/74 136/84








 Lab Results











WBC  2.8 K/mm3 (4.0-10.0)  L  19  07:00    


 


RBC  3.86 M/mm3 (4.00-5.60)  L  19  07:00    


 


Hgb  11.5 GM/dL (11.7-16.9)  L  19  07:00    


 


Hct  34.7 % (35.4-49)  L  19  07:00    


 


MCV  90.0 fl (80-96)   19  07:00    


 


MCHC  33.0 g/dl (32.0-35.9)   19  07:00    


 


RDW  14.4 % (11.9-15.9)   19  07:00    


 


Plt Count  161 K/MM3 (134-434)  D 19  07:00    


 


Sodium  139 mmol/L (136-145)   19  07:00    


 


Potassium  3.5 mmol/L (3.5-5.1)   19  07:00    


 


Chloride  104 mmol/L ()   19  07:00    


 


Carbon Dioxide  28 mmol/L (21-32)   19  07:00    


 


Anion Gap  6 MMOL/L (8-16)  L  19  07:00    


 


BUN  9.4 mg/dL (7-18)   19  07:00    


 


Creatinine  0.9 mg/dL (0.55-1.3)   19  07:00    


 


Random Glucose  95 mg/dL ()   19  07:00    


 


Calcium  8.4 mg/dL (8.5-10.1)  L  19  07:00    








Labs noted.


Assessment: 





06/15/19 12:36


AOX3, in no acute distress


Full ROM, ambulating in the unit.


Withdrawal symptoms.


Plan: 





continue detox


increase fluids.

## 2019-06-16 RX ADMIN — Medication SCH: at 10:18

## 2019-06-16 RX ADMIN — Medication SCH TAB: at 10:19

## 2019-06-16 RX ADMIN — LOPERAMIDE HYDROCHLORIDE PRN MG: 2 CAPSULE ORAL at 22:25

## 2019-06-16 RX ADMIN — Medication SCH: at 23:32

## 2019-06-16 RX ADMIN — Medication SCH MG: at 22:25

## 2019-06-16 RX ADMIN — Medication PRN MG: at 22:24

## 2019-06-16 NOTE — PN
S CIWA





- CIWA Score


Nausea/Vomitin


Muscle Tremors: 2


Anxiety: 1-Mildly Anxious


Agitation: 2


Paroxysmal Sweats: 2


Orientation: 0-Oriented


Tacttile Disturbances: 0-None


Auditory Disturbances: 0-None


Visual Disturbances: 0-None


Headache: 0-None Present


CIWA-Ar Total Score: 9





[facility] Progress Note (SOAP)


Subjective: 


CO ANXIETY POOR SLEEP SWEATING


Objective: 





19 13:37


 Vital Signs - 24 hr











  06/15/19 06/15/19 06/15/19





  13:59 18:05 21:50


 


Temperature 98.3 F 98.0 F 98.1 F


 


Pulse Rate 88 69 58 L


 


Respiratory 18 18 18





Rate   


 


Blood Pressure 148/89 131/89 134/81














  19





  00:30 03:30 06:45


 


Temperature   97.7 F


 


Pulse Rate   53 L


 


Respiratory 18 18 18





Rate   


 


Blood Pressure   116/62














  19





  09:06 13:08


 


Temperature 98 F 96.8 F L


 


Pulse Rate 71 58 L


 


Respiratory 20 18





Rate  


 


Blood Pressure 116/75 129/80








 Laboratory Tests











  19





  13:00 07:00 07:00


 


WBC    2.8 L


 


RBC    3.86 L


 


Hgb    11.5 L


 


Hct    34.7 L


 


MCV    90.0


 


MCH    29.7


 


MCHC    33.0


 


RDW    14.4


 


Plt Count    161  D


 


MPV    8.6


 


Sodium   


 


Potassium   


 


Chloride   


 


Carbon Dioxide   


 


Anion Gap   


 


BUN   


 


Creatinine   


 


Est GFR (CKD-EPI)AfAm   


 


Est GFR (CKD-EPI)NonAf   


 


Random Glucose   


 


Calcium   


 


Total Bilirubin   


 


AST   


 


ALT   


 


Alkaline Phosphatase   


 


Total Protein   


 


Albumin   


 


Urine Color  Yellow  


 


Urine Appearance  Clear  


 


Urine pH  7.5  D  


 


Ur Specific Gravity  1.057 H  


 


Urine Protein  Negative  


 


Urine Glucose (UA)  Negative  


 


Urine Ketones  Trace H  


 


Urine Blood  Negative  


 


Urine Nitrite  Negative  


 


Urine Bilirubin  Negative  


 


Urine Urobilinogen  1.0  


 


Ur Leukocyte Esterase  Negative  


 


RPR Titer   


 


HIV 1&2 Antibody Screen   Negative 


 


HIV P24 Antigen   Negative 














  19





  07:00 07:00


 


WBC  


 


RBC  


 


Hgb  


 


Hct  


 


MCV  


 


MCH  


 


MCHC  


 


RDW  


 


Plt Count  


 


MPV  


 


Sodium  139 


 


Potassium  3.5 


 


Chloride  104 


 


Carbon Dioxide  28 


 


Anion Gap  6 L 


 


BUN  9.4 


 


Creatinine  0.9 


 


Est GFR (CKD-EPI)AfAm  114.21 


 


Est GFR (CKD-EPI)NonAf  98.54 


 


Random Glucose  95 


 


Calcium  8.4 L 


 


Total Bilirubin  1.1 H 


 


AST  60 H 


 


ALT  49 


 


Alkaline Phosphatase  79 


 


Total Protein  7.3 


 


Albumin  3.6 


 


Urine Color  


 


Urine Appearance  


 


Urine pH  


 


Ur Specific Gravity  


 


Urine Protein  


 


Urine Glucose (UA)  


 


Urine Ketones  


 


Urine Blood  


 


Urine Nitrite  


 


Urine Bilirubin  


 


Urine Urobilinogen  


 


Ur Leukocyte Esterase  


 


RPR Titer   Nonreactive


 


HIV 1&2 Antibody Screen  


 


HIV P24 Antigen  











19 13:37


ALERT AMBULATING ORIENTED


Assessment: 





19 13:37


ETOH DEP AND WITHDRAWAL


Plan: 


CONT DETOX PROTOCOL

## 2019-06-17 VITALS — TEMPERATURE: 97.3 F | DIASTOLIC BLOOD PRESSURE: 71 MMHG | HEART RATE: 80 BPM | SYSTOLIC BLOOD PRESSURE: 129 MMHG

## 2019-06-17 RX ADMIN — Medication SCH TAB: at 10:02

## 2019-06-17 RX ADMIN — Medication SCH APPLIC: at 10:02

## 2019-06-17 RX ADMIN — LOPERAMIDE HYDROCHLORIDE PRN MG: 2 CAPSULE ORAL at 10:01

## 2019-06-17 NOTE — PN
[facility] CIWA





- CIWA Score


Nausea/Vomitin-No Nausea/No Vomiting


Muscle Tremors: None


Anxiety: 0-No Anxiety, at Ease


Agitation: 1-Slight > Activity


Paroxysmal Sweats: No Perspiration


Orientation: 0-Oriented


Tacttile Disturbances: 0-None


Auditory Disturbances: 0-None


Visual Disturbances: 0-None


Headache: 0-None Present


CIWA-Ar Total Score: 1





[facility] Progress Note (SOAP)


Subjective: 





Diarrhea (Mild, Significantly Improved Since Time Of Admission).


Objective: 


PATIENT A & O X 3, OBSERVED AMBULATING ON UNIT UNASSISTED. IN NO ACUTE DISTRESS.





19 17:12


 Vital Signs











Temperature  97.3 F L  19 09:30


 


Pulse Rate  80   19 09:30


 


Respiratory Rate  18   19 09:30


 


Blood Pressure  129/71   19 09:30


 


O2 Sat by Pulse Oximetry (%)      








 Laboratory Tests











  19





  13:00 07:00 07:00


 


WBC    2.8 L


 


RBC    3.86 L


 


Hgb    11.5 L


 


Hct    34.7 L


 


MCV    90.0


 


MCH    29.7


 


MCHC    33.0


 


RDW    14.4


 


Plt Count    161  D


 


MPV    8.6


 


Sodium   


 


Potassium   


 


Chloride   


 


Carbon Dioxide   


 


Anion Gap   


 


BUN   


 


Creatinine   


 


Est GFR (CKD-EPI)AfAm   


 


Est GFR (CKD-EPI)NonAf   


 


Random Glucose   


 


Calcium   


 


Total Bilirubin   


 


AST   


 


ALT   


 


Alkaline Phosphatase   


 


Total Protein   


 


Albumin   


 


Urine Color  Yellow  


 


Urine Appearance  Clear  


 


Urine pH  7.5  D  


 


Ur Specific Gravity  1.057 H  


 


Urine Protein  Negative  


 


Urine Glucose (UA)  Negative  


 


Urine Ketones  Trace H  


 


Urine Blood  Negative  


 


Urine Nitrite  Negative  


 


Urine Bilirubin  Negative  


 


Urine Urobilinogen  1.0  


 


Ur Leukocyte Esterase  Negative  


 


RPR Titer   


 


HIV 1&2 Antibody Screen   Negative 


 


HIV P24 Antigen   Negative 














  19





  07:00 07:00


 


WBC  


 


RBC  


 


Hgb  


 


Hct  


 


MCV  


 


MCH  


 


MCHC  


 


RDW  


 


Plt Count  


 


MPV  


 


Sodium  139 


 


Potassium  3.5 


 


Chloride  104 


 


Carbon Dioxide  28 


 


Anion Gap  6 L 


 


BUN  9.4 


 


Creatinine  0.9 


 


Est GFR (CKD-EPI)AfAm  114.21 


 


Est GFR (CKD-EPI)NonAf  98.54 


 


Random Glucose  95 


 


Calcium  8.4 L 


 


Total Bilirubin  1.1 H 


 


AST  60 H 


 


ALT  49 


 


Alkaline Phosphatase  79 


 


Total Protein  7.3 


 


Albumin  3.6 


 


Urine Color  


 


Urine Appearance  


 


Urine pH  


 


Ur Specific Gravity  


 


Urine Protein  


 


Urine Glucose (UA)  


 


Urine Ketones  


 


Urine Blood  


 


Urine Nitrite  


 


Urine Bilirubin  


 


Urine Urobilinogen  


 


Ur Leukocyte Esterase  


 


RPR Titer   Nonreactive


 


HIV 1&2 Antibody Screen  


 


HIV P24 Antigen  








LABS NOTED.


Assessment: 





19 17:13


COMPLETION OF DETOX REGIMEN.


Plan: 





SINCE PATIENT REPORTS THAT CURRENT WITHDRAWAL / DETOX SYMPTOMS ARE MINIMAL IN 

DEGREE AND THAT HE FEELS WELL OVERALL, AT PATIENTS REQUEST, HE WAS GRANTED AN  

EARLY DISCHARGE FROM DETOX UNIT TODAY.

## 2019-06-17 NOTE — DS
[facility] Detox Discharge Summary


Admission Date: 


06/13/19





Discharge Date: 06/17/19





- History


Present History: Alcohol Dependence, Cannabis Dependence, Sedative Dependence


Additional Comments: 





PATIENT REPORTS THAT CURRENT WITHDRAWAL / DETOX SYMPTOMS ARE MINIMAL IN DEGREE 

AND THAT HE FEELS WELL OVERALL AT TIME OF DISCHARGE FROM DETOX UNIT. PATIENT 

GOING TO [facility] ([place], [place]) FOR AFTERCARE. PATIENT 

ADVISED TO FOLLOW-UP WITH  AFTER DISCHARGE FROM DETOX FOR GENERAL MEDICAL 

ASSESSMENT AND FOR ELEVATED AST LEVEL ADN FOR ANEMIA AND FOR LOW WBC LEVEL 

NOTED ON DETOX ADMISSION LABORATORY ASSESSMENT. PATIENT VERBALIZED 

UNDERSTANDING OF RECOMMENDATION. COPIES OF RESULTS OF ALL LABS DRAWN WHILE 

ADMITTED FOR DETOX GIVEN TO PATIENT AT TIME OF DISCHARGE FROM DETOX UNIT. 

PATIENT WAS DISCHARGED FORM DETOX UNIT IN STABLE MEDICAL CONDITION.


Pertinent Past History: 





History Of Tracheostomy, History Of laceration Of face, History Of Sprain Of 

right Ankle, Weight Loss, Leukopenia, Anemia, Elevated AST Level.





- Physical Exam Results


Vital Signs: 


 Vital Signs











Temperature  97.3 F L  06/17/19 09:30


 


Pulse Rate  80   06/17/19 09:30


 


Respiratory Rate  18   06/17/19 09:30


 


Blood Pressure  129/71   06/17/19 09:30


 


O2 Sat by Pulse Oximetry (%)      











Pertinent Admission Physical Exam Findings: 





WITHDRAWAL SYMPTOMS.





 Laboratory Tests











  06/13/19 06/14/19 06/14/19





  13:00 07:00 07:00


 


WBC    2.8 L


 


RBC    3.86 L


 


Hgb    11.5 L


 


Hct    34.7 L


 


MCV    90.0


 


MCH    29.7


 


MCHC    33.0


 


RDW    14.4


 


Plt Count    161  D


 


MPV    8.6


 


Sodium   


 


Potassium   


 


Chloride   


 


Carbon Dioxide   


 


Anion Gap   


 


BUN   


 


Creatinine   


 


Est GFR (CKD-EPI)AfAm   


 


Est GFR (CKD-EPI)NonAf   


 


Random Glucose   


 


Calcium   


 


Total Bilirubin   


 


AST   


 


ALT   


 


Alkaline Phosphatase   


 


Total Protein   


 


Albumin   


 


Urine Color  Yellow  


 


Urine Appearance  Clear  


 


Urine pH  7.5  D  


 


Ur Specific Gravity  1.057 H  


 


Urine Protein  Negative  


 


Urine Glucose (UA)  Negative  


 


Urine Ketones  Trace H  


 


Urine Blood  Negative  


 


Urine Nitrite  Negative  


 


Urine Bilirubin  Negative  


 


Urine Urobilinogen  1.0  


 


Ur Leukocyte Esterase  Negative  


 


RPR Titer   


 


HIV 1&2 Antibody Screen   Negative 


 


HIV P24 Antigen   Negative 














  06/14/19 06/14/19





  07:00 07:00


 


WBC  


 


RBC  


 


Hgb  


 


Hct  


 


MCV  


 


MCH  


 


MCHC  


 


RDW  


 


Plt Count  


 


MPV  


 


Sodium  139 


 


Potassium  3.5 


 


Chloride  104 


 


Carbon Dioxide  28 


 


Anion Gap  6 L 


 


BUN  9.4 


 


Creatinine  0.9 


 


Est GFR (CKD-EPI)AfAm  114.21 


 


Est GFR (CKD-EPI)NonAf  98.54 


 


Random Glucose  95 


 


Calcium  8.4 L 


 


Total Bilirubin  1.1 H 


 


AST  60 H 


 


ALT  49 


 


Alkaline Phosphatase  79 


 


Total Protein  7.3 


 


Albumin  3.6 


 


Urine Color  


 


Urine Appearance  


 


Urine pH  


 


Ur Specific Gravity  


 


Urine Protein  


 


Urine Glucose (UA)  


 


Urine Ketones  


 


Urine Blood  


 


Urine Nitrite  


 


Urine Bilirubin  


 


Urine Urobilinogen  


 


Ur Leukocyte Esterase  


 


RPR Titer   Nonreactive


 


HIV 1&2 Antibody Screen  


 


HIV P24 Antigen  








LABS NOTED.





- Treatment


Hospital Course: Detox Protocol Followed, Detoxed Safely, Responded well, 

Discharged Condition Good


Patient has Accepted a Rehab Referral to: [facility] ([place], [place]).





- Medication


Discharge Medications: 


Ambulatory Orders





NK [No Known Home Medication]  02/24/19 











- Diagnosis


(1) Alcohol dependence with uncomplicated withdrawal


Status: Acute   





(2) History of tracheostomy


Status: Acute   





(3) Laceration of face


Status: Acute   


Qualifiers: 


   Encounter type: sequela   Qualified Code(s): S01.81XS - Laceration without 

foreign body of other part of head, sequela   





(4) Leukopenia


Status: Acute   


Qualifiers: 


   Leukopenia type: unspecified   Qualified Code(s): D72.819 - Decreased white 

blood cell count, unspecified   





(5) Sprain of right ankle


Status: Acute   


Qualifiers: 


   Encounter type: sequela   Involved ligament of ankle: unspecified ligament   

Qualified Code(s): S93.401S - Sprain of unspecified ligament of right ankle, 

sequela   





(6) Uncomplicated sedative, hypnotic or anxiolytic withdrawal


Status: Acute   





(7) Weight loss


Status: Acute   





(8) Cannabis dependence, uncomplicated


Status: Chronic   





(9) Anemia


Status: Acute   


Qualifiers: 


   Anemia type: unspecified type   Qualified Code(s): D64.9 - Anemia, 

unspecified   





(10) Elevated aspartate aminotransferase level


Status: Acute   





- AMA


Did Patient Leave Against Medical Advice: No

## 2019-07-03 ENCOUNTER — EMERGENCY (EMERGENCY)
Facility: HOSPITAL | Age: 49
LOS: 1 days | Discharge: HOME | End: 2019-07-03
Attending: EMERGENCY MEDICINE | Admitting: EMERGENCY MEDICINE
Payer: MEDICAID

## 2019-07-03 VITALS
DIASTOLIC BLOOD PRESSURE: 73 MMHG | TEMPERATURE: 97 F | RESPIRATION RATE: 16 BRPM | OXYGEN SATURATION: 94 % | SYSTOLIC BLOOD PRESSURE: 132 MMHG | HEART RATE: 83 BPM

## 2019-07-03 DIAGNOSIS — R41.82 ALTERED MENTAL STATUS, UNSPECIFIED: ICD-10-CM

## 2019-07-03 DIAGNOSIS — S09.90XA UNSPECIFIED INJURY OF HEAD, INITIAL ENCOUNTER: ICD-10-CM
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
## 2019-07-03 DIAGNOSIS — F10.120 ALCOHOL ABUSE WITH INTOXICATION, UNCOMPLICATED: ICD-10-CM
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
## 2019-07-03 LAB
ALBUMIN SERPL ELPH-MCNC: 4.2 G/DL — SIGNIFICANT CHANGE UP (ref 3.4–5)
ALP SERPL-CCNC: 78 U/L — SIGNIFICANT CHANGE UP (ref 40–120)
ALT FLD-CCNC: 30 U/L — SIGNIFICANT CHANGE UP (ref 12–42)
ANION GAP SERPL CALC-SCNC: 15 MMOL/L — SIGNIFICANT CHANGE UP (ref 9–16)
AST SERPL-CCNC: 41 U/L — HIGH (ref 15–37)
BILIRUB SERPL-MCNC: 0.6 MG/DL — SIGNIFICANT CHANGE UP (ref 0.2–1.2)
BUN SERPL-MCNC: 17 MG/DL — SIGNIFICANT CHANGE UP (ref 7–23)
CALCIUM SERPL-MCNC: 8.3 MG/DL — LOW (ref 8.5–10.5)
CHLORIDE SERPL-SCNC: 112 MMOL/L — HIGH (ref 96–108)
CO2 SERPL-SCNC: 22 MMOL/L — SIGNIFICANT CHANGE UP (ref 22–31)
CREAT SERPL-MCNC: 0.93 MG/DL — SIGNIFICANT CHANGE UP (ref 0.5–1.3)
ETHANOL SERPL-MCNC: 296 MG/DL — HIGH
GLUCOSE SERPL-MCNC: 65 MG/DL — LOW (ref 70–99)
HCT VFR BLD CALC: 36.2 % — LOW (ref 39–50)
HGB BLD-MCNC: 11.7 G/DL — LOW (ref 13–17)
MCHC RBC-ENTMCNC: 29.6 PG — SIGNIFICANT CHANGE UP (ref 27–34)
MCHC RBC-ENTMCNC: 32.3 G/DL — SIGNIFICANT CHANGE UP (ref 32–36)
MCV RBC AUTO: 91.6 FL — SIGNIFICANT CHANGE UP (ref 80–100)
PLATELET # BLD AUTO: 197 K/UL — SIGNIFICANT CHANGE UP (ref 150–400)
POTASSIUM SERPL-MCNC: 4.1 MMOL/L — SIGNIFICANT CHANGE UP (ref 3.5–5.3)
POTASSIUM SERPL-SCNC: 4.1 MMOL/L — SIGNIFICANT CHANGE UP (ref 3.5–5.3)
PROT SERPL-MCNC: 8.4 G/DL — HIGH (ref 6.4–8.2)
RBC # BLD: 3.95 M/UL — LOW (ref 4.2–5.8)
RBC # FLD: 14.9 % — HIGH (ref 10.3–14.5)
SODIUM SERPL-SCNC: 149 MMOL/L — HIGH (ref 132–145)
WBC # BLD: 4.4 K/UL — SIGNIFICANT CHANGE UP (ref 3.8–10.5)
WBC # FLD AUTO: 4.4 K/UL — SIGNIFICANT CHANGE UP (ref 3.8–10.5)

## 2019-07-03 PROCEDURE — 99284 EMERGENCY DEPT VISIT MOD MDM: CPT

## 2019-07-03 PROCEDURE — 70450 CT HEAD/BRAIN W/O DYE: CPT | Mod: 26

## 2019-07-03 NOTE — ED PROVIDER NOTE - MUSCULOSKELETAL, MLM
Small abrasion to right, top side of the head. No lacerations. Small abrasion to right, top side of the head. No lacerations. No other evidence of trauma.

## 2019-07-03 NOTE — ED ADULT NURSE NOTE - NSIMPLEMENTINTERV_GEN_ALL_ED
Implemented All Fall with Harm Risk Interventions:  [place] to call system. Call bell, personal items and telephone within reach. Instruct patient to call for assistance. Room bathroom lighting operational. Non-slip footwear when patient is off stretcher. Physically safe environment: no spills, clutter or unnecessary equipment. Stretcher in lowest position, wheels locked, appropriate side rails in place. Provide visual cue, wrist band, yellow gown, etc. Monitor gait and stability. Monitor for mental status changes and reorient to person, place, and time. Review medications for side effects contributing to fall risk. Reinforce activity limits and safety measures with patient and family. Provide visual clues: red socks.

## 2019-07-03 NOTE — ED PROVIDER NOTE - CONSTITUTIONAL, MLM
normal... Sleepy, but awakens to tactile stimuli and is able to give little history. Sleepy, but awakens to tactile stimuli and is able to give a little history.

## 2019-07-03 NOTE — ED PROVIDER NOTE - OBJECTIVE STATEMENT
48 y/o Male BIBA with AMS. Pedestrians called EMS when pt was found sitting in front of a [employer]. EMS states pt admitted to drinking Vodka and smoking Marijuana. He also has small abrasions to his head due to a possible fall. 50 y/o Male BIBA with AMS. Pedestrians called EMS when pt was found sitting in front of a [employer]. EMS states pt admitted to drinking Vodka and smoking Marijuana. He also has small abrasions to his head due to a possible fall.  Sleepy, awakens to voice, denies complaints.  Laughs and says "I'm just drunk"

## 2019-07-03 NOTE — ED ADULT NURSE NOTE - CHIEF COMPLAINT QUOTE
Bystander called EMS after patient was found sitting in front of [employer], admits drinking vodka and smoking marijuana. Abrasion noted to scalp, possible fall

## 2019-07-03 NOTE — ED PROVIDER NOTE - CLINICAL SUMMARY MEDICAL DECISION MAKING FREE TEXT BOX
50 y/o Male BIBA with AMS. Will order labs, head CT, and reassess. Will also wait for pt sobriety. 48 y/o Male BIBA with AMS. Will order labs, head CT, and reassess. Will also wait for pt sobriety.  /// Patient now awake, alert, oriented x 3, ambulatory with steady gait and balance, requesting to be discharged.  Abrasions cleaned and dressed.  Will d/c home.  Importance of close followup and precautions for immediate return discussed.

## 2019-07-03 NOTE — ED PROVIDER NOTE - NSFOLLOWUPINSTRUCTIONS_ED_ALL_ED_FT
Alcohol Abuse    Alcohol intoxication occurs when the amount of alcohol that a person has consumed impairs his or her ability to mentally and physically function. Chronic alcohol consumption can also lead to a variety of health issues including neurological disease, stomach disease, heart disease, liver disease, etc. Do not drive after drinking alcohol. Drinking enough alcohol to end up in an Emergency Room suggests you may have an alcohol abuse problem. Seek help at a drug addiction center.    SEEK IMMEDIATE MEDICAL CARE IF YOU HAVE ANY OF THE FOLLOWING SYMPTOMS: seizures, vomiting blood, blood in your stool, lightheadedness/dizziness, or becoming shaky to tremulous when you stop drinking.     Please see your primary care doctor in the next 2-3 days for a repeat evaluation.  Please take all of today's paperwork with you.  If you don't have a doctor or would like help finding a new one, please contact our call center at [phone].    Please return immediately to the Emergency Department if you have pain, fever, trouble breathing, a rash, or if you have any other problems or concerns at all.   You can reach us at [phone], 24 hours a day, 7 days a week.

## 2019-07-03 NOTE — ED ADULT TRIAGE NOTE - CHIEF COMPLAINT QUOTE
Bystander called EMS after patient was found sitting in front of [employer], admits drinking vodka and smoking marijuana Bystander called EMS after patient was found sitting in front of [employer], admits drinking vodka and smoking marijuana. Abrasion noted to scalp, possible fall

## 2019-08-01 ENCOUNTER — OUTPATIENT (OUTPATIENT)
Dept: OUTPATIENT SERVICES | Facility: HOSPITAL | Age: 49
LOS: 1 days | End: 2019-08-01
Payer: MEDICAID

## 2019-09-05 DIAGNOSIS — Z71.89 OTHER SPECIFIED COUNSELING: ICD-10-CM

## 2019-11-26 ENCOUNTER — HOSPITAL ENCOUNTER (INPATIENT)
Dept: HOSPITAL 74 - YASAS | Age: 49
LOS: 4 days | Discharge: HOME | End: 2019-11-30
Attending: ALLERGY & IMMUNOLOGY | Admitting: ALLERGY & IMMUNOLOGY
Payer: COMMERCIAL

## 2019-11-26 VITALS — BODY MASS INDEX: 26.3 KG/M2

## 2019-11-26 DIAGNOSIS — F13.230: ICD-10-CM

## 2019-11-26 DIAGNOSIS — D64.9: ICD-10-CM

## 2019-11-26 DIAGNOSIS — D72.819: ICD-10-CM

## 2019-11-26 DIAGNOSIS — Z98.890: ICD-10-CM

## 2019-11-26 DIAGNOSIS — F10.230: Primary | ICD-10-CM

## 2019-11-26 DIAGNOSIS — F12.20: ICD-10-CM

## 2019-11-26 DIAGNOSIS — R74.0: ICD-10-CM

## 2019-11-26 PROCEDURE — HZ2ZZZZ DETOXIFICATION SERVICES FOR SUBSTANCE ABUSE TREATMENT: ICD-10-PCS | Performed by: ALLERGY & IMMUNOLOGY

## 2019-11-26 RX ADMIN — Medication PRN MG: at 22:19

## 2019-11-26 RX ADMIN — Medication SCH MG: at 22:19

## 2019-11-26 NOTE — PN
Teaching Attending Note


Name of Resident: Aurea Beth





ATTENDING PHYSICIAN STATEMENT





I saw and evaluated the patient.


I reviewed the resident's note and discussed the case with the resident.


I agree with the resident's findings and plan as documented.








SUBJECTIVE:  pt here requesting detox from etoh and  xanax  use , reports  2 x 

24 oz of beer today  , 1/2 pint of vodka  and  6 beers of 16 ounces  yesterday ,

daily use  6 x  16 oz cans daily with 1/2 pint vodka, denies seizures, + 

blackouts last episode 4 days ago, fell on steps, was brought to hospital and dc

/d told he hit his back with no injuries . 


First age of use  13 sober 3 years ([id number]), ([id number]) , relapsed  3 

months ago, prior sobriety 40 days while in AA . 


benzo : xanax since  25 years old ,  4 mg every 3 days , latest use yesterday 


cannabis  : 10 $  last  saturday 


PSHx: Tracheostomy 2017








OBJECTIVE:  wnwd  


 Vital Signs - 24 hr











  11/26/19 11/26/19





  11:37 12:06


 


Temperature 98.4 F 98.4 F


 


Pulse Rate 83 83


 


Respiratory 18 18





Rate  


 


Blood Pressure 149/83 149/83

















ASSESSMENT AND PLAN:  AUD / Sedative use , episodic- detox  .

## 2019-11-27 LAB
ALBUMIN SERPL-MCNC: 3.5 G/DL (ref 3.4–5)
ALP SERPL-CCNC: 76 U/L (ref 45–117)
ALT SERPL-CCNC: 60 U/L (ref 13–61)
ANION GAP SERPL CALC-SCNC: 6 MMOL/L (ref 8–16)
AST SERPL-CCNC: 73 U/L (ref 15–37)
BILIRUB SERPL-MCNC: 0.9 MG/DL (ref 0.2–1)
BUN SERPL-MCNC: 12 MG/DL (ref 7–18)
CALCIUM SERPL-MCNC: 8.9 MG/DL (ref 8.5–10.1)
CHLORIDE SERPL-SCNC: 108 MMOL/L (ref 98–107)
CO2 SERPL-SCNC: 24 MMOL/L (ref 21–32)
CREAT SERPL-MCNC: 0.9 MG/DL (ref 0.55–1.3)
DEPRECATED RDW RBC AUTO: 14.2 % (ref 11.9–15.9)
GLUCOSE SERPL-MCNC: 100 MG/DL (ref 74–106)
HCT VFR BLD CALC: 36.1 % (ref 35.4–49)
HGB BLD-MCNC: 12 GM/DL (ref 11.7–16.9)
MCH RBC QN AUTO: 30.3 PG (ref 25.7–33.7)
MCHC RBC AUTO-ENTMCNC: 33.4 G/DL (ref 32–35.9)
MCV RBC: 90.8 FL (ref 80–96)
PLATELET # BLD AUTO: 220 K/MM3 (ref 134–434)
PMV BLD: 8.4 FL (ref 7.5–11.1)
POTASSIUM SERPLBLD-SCNC: 3.8 MMOL/L (ref 3.5–5.1)
PROT SERPL-MCNC: 7.3 G/DL (ref 6.4–8.2)
RBC # BLD AUTO: 3.97 M/MM3 (ref 4–5.6)
SODIUM SERPL-SCNC: 138 MMOL/L (ref 136–145)
WBC # BLD AUTO: 3.2 K/MM3 (ref 4–10)

## 2019-11-27 RX ADMIN — FAMOTIDINE SCH MG: 20 TABLET ORAL at 22:33

## 2019-11-27 RX ADMIN — Medication SCH MG: at 22:33

## 2019-11-27 RX ADMIN — FAMOTIDINE SCH MG: 20 TABLET ORAL at 17:18

## 2019-11-27 RX ADMIN — Medication SCH TAB: at 10:09

## 2019-11-27 RX ADMIN — Medication PRN MG: at 22:35

## 2019-11-27 RX ADMIN — LOPERAMIDE HYDROCHLORIDE PRN MG: 2 CAPSULE ORAL at 05:19

## 2019-11-27 NOTE — PN
[facility] CIWA





- CIWA Score


Nausea/Vomitin-Mild Nausea/No Vomiting


Muscle Tremors: 3


Anxiety: 2


Agitation: 1-Slight > Activity


Paroxysmal Sweats: 2


Orientation: 0-Oriented


Tacttile Disturbances: 1-Very Mild Itch/Numbness


Auditory Disturbances: 0-None


Visual Disturbances: 0-None


Headache: 1-Very Mild


CIWA-Ar Total Score: 11





[facility] Progress Note (SOAP)


Subjective: 





49 years old male admitted on 19 for alcohol and benzo withdrawal sx 

management


treated with ativan detox regimen


c/o gi distress


discontinue motrin begin pepcid 20 mg bid





Objective: 





19 14:38


 Vital Signs











Temperature  98.1 F   19 13:49


 


Pulse Rate  56 L  19 13:49


 


Respiratory Rate  18   19 13:49


 


Blood Pressure  131/85   19 13:49


 


O2 Sat by Pulse Oximetry (%)      








 Laboratory Last Values











WBC  3.2 K/mm3 (4.0-10.0)  L  19  07:40    


 


RBC  3.97 M/mm3 (4.00-5.60)  L  19  07:40    


 


Hgb  12.0 GM/dL (11.7-16.9)   19  07:40    


 


Hct  36.1 % (35.4-49)   19  07:40    


 


MCV  90.8 fl (80-96)   19  07:40    


 


MCH  30.3 pg (25.7-33.7)   19  07:40    


 


MCHC  33.4 g/dl (32.0-35.9)   19  07:40    


 


RDW  14.2 % (11.9-15.9)   19  07:40    


 


Plt Count  220 K/MM3 (134-434)  D 19  07:40    


 


MPV  8.4 fl (7.5-11.1)   19  07:40    


 


Sodium  138 mmol/L (136-145)   19  07:40    


 


Potassium  3.8 mmol/L (3.5-5.1)   19  07:40    


 


Chloride  108 mmol/L ()  H  19  07:40    


 


Carbon Dioxide  24 mmol/L (21-32)   19  07:40    


 


Anion Gap  6 MMOL/L (8-16)  L  19  07:40    


 


BUN  12.0 mg/dL (7-18)   19  07:40    


 


Creatinine  0.9 mg/dL (0.55-1.3)   19  07:40    


 


Est GFR (CKD-EPI)AfAm  114.21   19  07:40    


 


Est GFR (CKD-EPI)NonAf  98.54   19  07:40    


 


Random Glucose  100 mg/dL ()   19  07:40    


 


Calcium  8.9 mg/dL (8.5-10.1)   19  07:40    


 


Total Bilirubin  0.9 mg/dL (0.2-1)   19  07:40    


 


AST  73 U/L (15-37)  H  19  07:40    


 


ALT  60 U/L (13-61)   19  07:40    


 


Alkaline Phosphatase  76 U/L ()   19  07:40    


 


Total Protein  7.3 g/dl (6.4-8.2)   19  07:40    


 


Albumin  3.5 g/dl (3.4-5.0)   19  07:40    


 


RPR Titer  Nonreactive  (NONREACTIVE)   19  07:40    








lab noted


Assessment: 





19 14:38


alcohol and benzo withdrawal sx


Plan: 





continue ativan detox regimen

## 2019-11-28 RX ADMIN — FAMOTIDINE SCH MG: 20 TABLET ORAL at 10:06

## 2019-11-28 RX ADMIN — METHOCARBAMOL PRN MG: 500 TABLET ORAL at 22:37

## 2019-11-28 RX ADMIN — FAMOTIDINE SCH MG: 20 TABLET ORAL at 22:06

## 2019-11-28 RX ADMIN — Medication PRN MG: at 22:06

## 2019-11-28 RX ADMIN — Medication SCH TAB: at 10:06

## 2019-11-28 RX ADMIN — LOPERAMIDE HYDROCHLORIDE PRN MG: 2 CAPSULE ORAL at 17:39

## 2019-11-28 RX ADMIN — Medication SCH MG: at 22:06

## 2019-11-28 NOTE — PN
[facility] CIWA





- CIWA Score


Nausea/Vomitin-Mild Nausea/No Vomiting


Muscle Tremors: 2


Anxiety: 3


Agitation: 2


Paroxysmal Sweats: 1-Minimal Palms Moist


Orientation: 0-Oriented


Tacttile Disturbances: 0-None


Auditory Disturbances: 0-None


Visual Disturbances: 0-None


Headache: 1-Very Mild


CIWA-Ar Total Score: 10





[facility] Progress Note (SOAP)


Subjective: 





49 years old male admitted on 19 for alcohol and benzo withdrawal sx 

management treated with ativan detox regimen


feeling ok today ambulating on hallway denies dizziness 


Objective: 





19 11:31


 Vital Signs











Temperature  99.1 F   19 09:17


 


Pulse Rate  73   19 09:17


 


Respiratory Rate  18   19 09:17


 


Blood Pressure  136/82   19 09:17


 


O2 Sat by Pulse Oximetry (%)      








 Laboratory Last Values











WBC  3.2 K/mm3 (4.0-10.0)  L  19  07:40    


 


RBC  3.97 M/mm3 (4.00-5.60)  L  19  07:40    


 


Hgb  12.0 GM/dL (11.7-16.9)   19  07:40    


 


Hct  36.1 % (35.4-49)   19  07:40    


 


MCV  90.8 fl (80-96)   19  07:40    


 


MCH  30.3 pg (25.7-33.7)   19  07:40    


 


MCHC  33.4 g/dl (32.0-35.9)   19  07:40    


 


RDW  14.2 % (11.9-15.9)   19  07:40    


 


Plt Count  220 K/MM3 (134-434)  D 19  07:40    


 


MPV  8.4 fl (7.5-11.1)   19  07:40    


 


Sodium  138 mmol/L (136-145)   19  07:40    


 


Potassium  3.8 mmol/L (3.5-5.1)   19  07:40    


 


Chloride  108 mmol/L ()  H  19  07:40    


 


Carbon Dioxide  24 mmol/L (21-32)   19  07:40    


 


Anion Gap  6 MMOL/L (8-16)  L  19  07:40    


 


BUN  12.0 mg/dL (7-18)   19  07:40    


 


Creatinine  0.9 mg/dL (0.55-1.3)   19  07:40    


 


Est GFR (CKD-EPI)AfAm  114.21   19  07:40    


 


Est GFR (CKD-EPI)NonAf  98.54   19  07:40    


 


Random Glucose  100 mg/dL ()   19  07:40    


 


Calcium  8.9 mg/dL (8.5-10.1)   19  07:40    


 


Total Bilirubin  0.9 mg/dL (0.2-1)   19  07:40    


 


AST  73 U/L (15-37)  H  19  07:40    


 


ALT  60 U/L (13-61)   19  07:40    


 


Alkaline Phosphatase  76 U/L ()   19  07:40    


 


Total Protein  7.3 g/dl (6.4-8.2)   19  07:40    


 


Albumin  3.5 g/dl (3.4-5.0)   19  07:40    


 


RPR Titer  Nonreactive  (NONREACTIVE)   19  07:40    








lab noted


Assessment: 





19 11:31


alcohol and benzo withdrawal sx


Plan: 





continue ativan detox regimen

## 2019-11-29 RX ADMIN — METHOCARBAMOL PRN MG: 500 TABLET ORAL at 05:38

## 2019-11-29 RX ADMIN — FAMOTIDINE SCH MG: 20 TABLET ORAL at 22:06

## 2019-11-29 RX ADMIN — Medication SCH MG: at 22:06

## 2019-11-29 RX ADMIN — METHOCARBAMOL PRN MG: 500 TABLET ORAL at 15:04

## 2019-11-29 RX ADMIN — FAMOTIDINE SCH MG: 20 TABLET ORAL at 10:10

## 2019-11-29 RX ADMIN — Medication PRN MG: at 22:06

## 2019-11-29 RX ADMIN — Medication SCH TAB: at 10:10

## 2019-11-29 NOTE — PN
[facility] CIWA





- CIWA Score


Nausea/Vomitin-Int. Nausea w/Dry Heave


Muscle Tremors: 1-None Visible, but Felt


Anxiety: 1-Mildly Anxious


Agitation: 1-Slight > Activity


Paroxysmal Sweats: 2


Orientation: 0-Oriented


Tacttile Disturbances: 2-Mild Itch/Numbness/Burn


Auditory Disturbances: 0-None


Visual Disturbances: 0-None


Headache: 0-None Present


CIWA-Ar Total Score: 11





[facility] Progress Note (SOAP)


Subjective: 





interrupted sleep, sweats, nausea, vomiting x1, muscle cramps


Objective: 





19 11:31


 Vital Signs











Temperature  97.8 F   19 09:25


 


Pulse Rate  78   19 09:25


 


Respiratory Rate  18   19 09:25


 


Blood Pressure  128/74   19 09:25


 


O2 Sat by Pulse Oximetry (%)      








 Laboratory Tests











  19





  07:40 07:40 07:40


 


WBC  3.2 L  


 


RBC  3.97 L  


 


Hgb  12.0  


 


Hct  36.1  


 


MCV  90.8  


 


MCH  30.3  


 


MCHC  33.4  


 


RDW  14.2  


 


Plt Count  220  D  


 


MPV  8.4  


 


Sodium   138 


 


Potassium   3.8 


 


Chloride   108 H 


 


Carbon Dioxide   24 


 


Anion Gap   6 L 


 


BUN   12.0 


 


Creatinine   0.9 


 


Est GFR (CKD-EPI)AfAm   114.21 


 


Est GFR (CKD-EPI)NonAf   98.54 


 


Random Glucose   100 


 


Calcium   8.9 


 


Total Bilirubin   0.9 


 


AST   73 H 


 


ALT   60 


 


Alkaline Phosphatase   76 


 


Total Protein   7.3 


 


Albumin   3.5 


 


RPR Titer    Nonreactive


 


HIV 1&2 Antibody Screen    Cancelled


 


HIV P24 Antigen    Cancelled








pt aox3 lying in bed no vomiting 


Assessment: 





19 11:33


withdrawal sx's 


cramps


leucopenia 3.2 


elevated sgot/sgpt


19 11:35





19 11:36





Plan: 





cont. detox 


increase fluids 


robaxin prn


mylanta prn

## 2019-11-30 VITALS — TEMPERATURE: 96.8 F | HEART RATE: 58 BPM | SYSTOLIC BLOOD PRESSURE: 138 MMHG | DIASTOLIC BLOOD PRESSURE: 74 MMHG

## 2019-11-30 RX ADMIN — FAMOTIDINE SCH MG: 20 TABLET ORAL at 09:34

## 2019-11-30 RX ADMIN — Medication SCH TAB: at 09:34

## 2019-11-30 NOTE — DS
[facility] Detox Discharge Summary


Admission Date: 


19





Discharge Date: 19





- History


Present History: Alcohol Dependence, Cannabis Dependence, Sedative Dependence


Additional Comments: 





Pt is medically cleared and is discharged today. Pt completed the detox 

protocol. Pt is encouraged to complete rehab protocol and also follow-up with 

outpatient CD program and also to follow-up with his pmd.  Pt verbalized 

understanding. Pt is alert and oriented x3 and in no acute respiratory distress.


Pertinent Past History: 





H/o alcohol, benzo's, cannabis use disorder.





- Physical Exam Results


Vital Signs: 


 Vital Signs











Temperature  96.8 F L  19 09:28


 


Pulse Rate  58 L  19 09:28


 


Respiratory Rate  18   19 09:28


 


Blood Pressure  138/74   19 09:28


 


O2 Sat by Pulse Oximetry (%)      








 Vital Signs











  19





  06:37 09:28


 


Temperature 97.6 F 96.8 F L


 


Pulse Rate 51 L 58 L


 


Respiratory 18 18





Rate  


 


Blood Pressure 155/83 138/74








 Laboratory Last Values











WBC  3.2 K/mm3 (4.0-10.0)  L  19  07:40    


 


RBC  3.97 M/mm3 (4.00-5.60)  L  19  07:40    


 


Hgb  12.0 GM/dL (11.7-16.9)   19  07:40    


 


Hct  36.1 % (35.4-49)   19  07:40    


 


MCV  90.8 fl (80-96)   19  07:40    


 


MCH  30.3 pg (25.7-33.7)   19  07:40    


 


MCHC  33.4 g/dl (32.0-35.9)   19  07:40    


 


RDW  14.2 % (11.9-15.9)   19  07:40    


 


Plt Count  220 K/MM3 (134-434)  D 19  07:40    


 


MPV  8.4 fl (7.5-11.1)   19  07:40    


 


Sodium  138 mmol/L (136-145)   19  07:40    


 


Potassium  3.8 mmol/L (3.5-5.1)   19  07:40    


 


Chloride  108 mmol/L ()  H  19  07:40    


 


Carbon Dioxide  24 mmol/L (21-32)   19  07:40    


 


Anion Gap  6 MMOL/L (8-16)  L  19  07:40    


 


BUN  12.0 mg/dL (7-18)   19  07:40    


 


Creatinine  0.9 mg/dL (0.55-1.3)   19  07:40    


 


Est GFR (CKD-EPI)AfAm  114.21   19  07:40    


 


Est GFR (CKD-EPI)NonAf  98.54   19  07:40    


 


Random Glucose  100 mg/dL ()   19  07:40    


 


Calcium  8.9 mg/dL (8.5-10.1)   19  07:40    


 


Total Bilirubin  0.9 mg/dL (0.2-1)   19  07:40    


 


AST  73 U/L (15-37)  H  19  07:40    


 


ALT  60 U/L (13-61)   19  07:40    


 


Alkaline Phosphatase  76 U/L ()   19  07:40    


 


Total Protein  7.3 g/dl (6.4-8.2)   19  07:40    


 


Albumin  3.5 g/dl (3.4-5.0)   19  07:40    


 


RPR Titer  Nonreactive  (NONREACTIVE)   19  07:40    


 


HIV 1&2 Ag/Ab, 4th Gen  Non reactive  (Non Reactive)   19  14:50    


 


HIV 1&2 Antibody Screen  Cancelled   19  07:40    


 


HIV P24 Antigen  Cancelled   19  07:40    








Labs noted.


Pertinent Admission Physical Exam Findings: 





withdrawal symptoms.





- Treatment


Hospital Course: Detox Protocol Followed, Detoxed Safely, Responded well, 

Discharged Condition Good





- Medication


Discharge Medications: 


Ambulatory Orders





NK [No Known Home Medication]  19 











- Diagnosis


(1) Alcohol dependence with uncomplicated withdrawal


Status: Acute   





(2) Anemia


Status: Acute   


Qualifiers: 


   Anemia type: unspecified type   Qualified Code(s): D64.9 - Anemia, 

unspecified   





(3) Elevated aspartate aminotransferase level


Status: Acute   





(4) History of tracheostomy


Status: Acute   





(5) Leukopenia


Status: Acute   


Qualifiers: 


   Leukopenia type: unspecified   Qualified Code(s): D72.819 - Decreased white 

blood cell count, unspecified   





(6) Cannabis dependence, uncomplicated


Status: Chronic   





- AMA


Did Patient Leave Against Medical Advice: No





S CIWA





- CIWA Score


Nausea/Vomitin-No Nausea/No Vomiting


Muscle Tremors: None


Anxiety: 1-Mildly Anxious


Agitation: 0-Normal Activity


Paroxysmal Sweats: 2


Orientation: 0-Oriented


Tacttile Disturbances: 0-None


Auditory Disturbances: 0-None


Visual Disturbances: 0-None


Headache: 0-None Present


CIWA-Ar Total Score: 3

## 2020-05-01 PROCEDURE — G9005: CPT

## 2020-12-24 ENCOUNTER — EMERGENCY (EMERGENCY)
Facility: HOSPITAL | Age: 50
LOS: 1 days | Discharge: HOME | End: 2020-12-24
Attending: EMERGENCY MEDICINE | Admitting: EMERGENCY MEDICINE
Payer: MEDICAID

## 2020-12-24 VITALS
RESPIRATION RATE: 18 BRPM | OXYGEN SATURATION: 97 % | DIASTOLIC BLOOD PRESSURE: 99 MMHG | TEMPERATURE: 98 F | SYSTOLIC BLOOD PRESSURE: 160 MMHG | HEART RATE: 75 BPM

## 2020-12-24 VITALS
TEMPERATURE: 98 F | HEIGHT: 69 IN | WEIGHT: 169.98 LBS | HEART RATE: 68 BPM | DIASTOLIC BLOOD PRESSURE: 95 MMHG | SYSTOLIC BLOOD PRESSURE: 152 MMHG | RESPIRATION RATE: 18 BRPM | OXYGEN SATURATION: 97 %

## 2020-12-24 DIAGNOSIS — R41.82 ALTERED MENTAL STATUS, UNSPECIFIED: ICD-10-CM

## 2020-12-24 DIAGNOSIS — E16.2 HYPOGLYCEMIA, UNSPECIFIED: ICD-10-CM

## 2020-12-24 LAB
ALBUMIN SERPL ELPH-MCNC: 4.4 G/DL — SIGNIFICANT CHANGE UP (ref 3.4–5)
ALP SERPL-CCNC: 84 U/L — SIGNIFICANT CHANGE UP (ref 40–120)
ALT FLD-CCNC: 28 U/L — SIGNIFICANT CHANGE UP (ref 12–42)
ANION GAP SERPL CALC-SCNC: 8 MMOL/L — LOW (ref 9–16)
AST SERPL-CCNC: 45 U/L — HIGH (ref 15–37)
BASOPHILS # BLD AUTO: 0.02 K/UL — SIGNIFICANT CHANGE UP (ref 0–0.2)
BASOPHILS NFR BLD AUTO: 0.5 % — SIGNIFICANT CHANGE UP (ref 0–2)
BILIRUB SERPL-MCNC: 0.6 MG/DL — SIGNIFICANT CHANGE UP (ref 0.2–1.2)
BUN SERPL-MCNC: 13 MG/DL — SIGNIFICANT CHANGE UP (ref 7–23)
CALCIUM SERPL-MCNC: 8.4 MG/DL — LOW (ref 8.5–10.5)
CHLORIDE SERPL-SCNC: 107 MMOL/L — SIGNIFICANT CHANGE UP (ref 96–108)
CO2 SERPL-SCNC: 24 MMOL/L — SIGNIFICANT CHANGE UP (ref 22–31)
CREAT SERPL-MCNC: 0.88 MG/DL — SIGNIFICANT CHANGE UP (ref 0.5–1.3)
EOSINOPHIL # BLD AUTO: 0.01 K/UL — SIGNIFICANT CHANGE UP (ref 0–0.5)
EOSINOPHIL NFR BLD AUTO: 0.2 % — SIGNIFICANT CHANGE UP (ref 0–6)
ETHANOL SERPL-MCNC: 442 MG/DL — HIGH
GLUCOSE BLDC GLUCOMTR-MCNC: 123 MG/DL — HIGH (ref 70–99)
GLUCOSE BLDC GLUCOMTR-MCNC: 65 MG/DL — LOW (ref 70–99)
GLUCOSE BLDC GLUCOMTR-MCNC: 69 MG/DL — LOW (ref 70–99)
GLUCOSE BLDC GLUCOMTR-MCNC: 89 MG/DL — SIGNIFICANT CHANGE UP (ref 70–99)
GLUCOSE SERPL-MCNC: 76 MG/DL — SIGNIFICANT CHANGE UP (ref 70–99)
HCT VFR BLD CALC: 43.4 % — SIGNIFICANT CHANGE UP (ref 39–50)
HGB BLD-MCNC: 13.6 G/DL — SIGNIFICANT CHANGE UP (ref 13–17)
IMM GRANULOCYTES NFR BLD AUTO: 0.2 % — SIGNIFICANT CHANGE UP (ref 0–1.5)
LYMPHOCYTES # BLD AUTO: 1.58 K/UL — SIGNIFICANT CHANGE UP (ref 1–3.3)
LYMPHOCYTES # BLD AUTO: 36.3 % — SIGNIFICANT CHANGE UP (ref 13–44)
MAGNESIUM SERPL-MCNC: 2.4 MG/DL — SIGNIFICANT CHANGE UP (ref 1.6–2.6)
MCHC RBC-ENTMCNC: 29.4 PG — SIGNIFICANT CHANGE UP (ref 27–34)
MCHC RBC-ENTMCNC: 31.3 GM/DL — LOW (ref 32–36)
MCV RBC AUTO: 93.9 FL — SIGNIFICANT CHANGE UP (ref 80–100)
MONOCYTES # BLD AUTO: 0.37 K/UL — SIGNIFICANT CHANGE UP (ref 0–0.9)
MONOCYTES NFR BLD AUTO: 8.5 % — SIGNIFICANT CHANGE UP (ref 2–14)
NEUTROPHILS # BLD AUTO: 2.36 K/UL — SIGNIFICANT CHANGE UP (ref 1.8–7.4)
NEUTROPHILS NFR BLD AUTO: 54.3 % — SIGNIFICANT CHANGE UP (ref 43–77)
NRBC # BLD: 0 /100 WBCS — SIGNIFICANT CHANGE UP (ref 0–0)
PLATELET # BLD AUTO: 192 K/UL — SIGNIFICANT CHANGE UP (ref 150–400)
POTASSIUM SERPL-MCNC: 4 MMOL/L — SIGNIFICANT CHANGE UP (ref 3.5–5.3)
POTASSIUM SERPL-SCNC: 4 MMOL/L — SIGNIFICANT CHANGE UP (ref 3.5–5.3)
PROT SERPL-MCNC: 9 G/DL — HIGH (ref 6.4–8.2)
RBC # BLD: 4.62 M/UL — SIGNIFICANT CHANGE UP (ref 4.2–5.8)
RBC # FLD: 13.9 % — SIGNIFICANT CHANGE UP (ref 10.3–14.5)
SODIUM SERPL-SCNC: 139 MMOL/L — SIGNIFICANT CHANGE UP (ref 132–145)
WBC # BLD: 4.35 K/UL — SIGNIFICANT CHANGE UP (ref 3.8–10.5)
WBC # FLD AUTO: 4.35 K/UL — SIGNIFICANT CHANGE UP (ref 3.8–10.5)

## 2020-12-24 PROCEDURE — 99284 EMERGENCY DEPT VISIT MOD MDM: CPT

## 2020-12-24 RX ORDER — DEXTROSE 50 % IN WATER 50 %
25 SYRINGE (ML) INTRAVENOUS ONCE
Refills: 0 | Status: COMPLETED | OUTPATIENT
Start: 2020-12-24 | End: 2020-12-24

## 2020-12-24 RX ORDER — SODIUM CHLORIDE 9 MG/ML
1000 INJECTION, SOLUTION INTRAVENOUS
Refills: 0 | Status: DISCONTINUED | OUTPATIENT
Start: 2020-12-24 | End: 2020-12-24

## 2020-12-24 RX ORDER — DEXTROSE 10 % IN WATER 10 %
1000 INTRAVENOUS SOLUTION INTRAVENOUS
Refills: 0 | Status: DISCONTINUED | OUTPATIENT
Start: 2020-12-24 | End: 2020-12-28

## 2020-12-24 RX ADMIN — Medication 25 MILLILITER(S): at 19:31

## 2020-12-24 RX ADMIN — Medication 150 MILLILITER(S): at 21:15

## 2020-12-24 NOTE — ED ADULT NURSE REASSESSMENT NOTE - NS ED NURSE REASSESS COMMENT FT1
Pt. got up, ripped IV off and went to bathroom. Pt. ambulating with a steady gait. Pt. states he was celebrating his sister baby shower yesterday and "went overboard with the drinking". Pt. states he does not recall how he got here but feels better and would like to go home. FS 65 mg/dL. Apple juice, 2 P&B sandwiches and cookies given. Will monitor for 1 hours as per PA Fen and then reassess.

## 2020-12-24 NOTE — ED PROVIDER NOTE - CLINICAL SUMMARY MEDICAL DECISION MAKING FREE TEXT BOX
Pt [id number] intoxicated, FS w hypoglycemia, will get basic labs and give dose of d50, follow for clinical sobriety. Pt presents intoxicated, FS w hypoglycemia, will get basic labs and give dose of d50, follow for clinical sobriety.

## 2020-12-24 NOTE — ED PROVIDER NOTE - PATIENT PORTAL LINK FT
You can access the FollowMyHealth Patient Portal offered by [facility] by registering at the following website: [email or web address]. By joining [facility]’s FollowMyHealth portal, you will also be able to view your health information using other applications (apps) compatible with our system.

## 2020-12-24 NOTE — ED PROVIDER NOTE - OBJECTIVE STATEMENT
50 male, bibems from [facility] for AMS, maybe alcohol intoxication. Some limited hx. Old chart review shows prior visits for similar.

## 2020-12-24 NOTE — ED ADULT TRIAGE NOTE - CHIEF COMPLAINT QUOTE
Patient to ED for AMS due to alcohol intoxication.  Admits to drinking vodka and beer at [facility].

## 2020-12-24 NOTE — ED ADULT NURSE NOTE - CHIEF COMPLAINT QUOTE
Patient to ED for AMS due to alcohol intoxication.  Admits to drinking vodka and beer at [facility].

## 2020-12-24 NOTE — ED PROVIDER NOTE - PROGRESS NOTE DETAILS
pt here for public intox, euglycemic, monitored in the ED with improved mental status, AFVSS, currently ambulatory with steady gait, tolerating PO without N/V, clear speech, without additional medical complaints noted.  Repeat exam and neuro/cranial nerve exams wnl.  No evidence of head/neck trauma. Pt feels much better and safe for discharge. Counseling provided. Medically stable for d/c. received sign out from Dr. Moon pending clinical sobriety.  Pt's repeat FS 89, still not clinically sober enough for po trial.  Will start D10 gtt at 150cc/hr and monitor FS pt pulled out his IV after getting up to go to the bathroom.  Ambulatory with steady gait.  Repeat FS 65, otherwise asymptomatic and reports no h/o DM.  Will give complex carb for oral supplementation and monitor clinically.  Consider octreotide if glucose remains tenuous due to chronic alcoholism

## 2020-12-24 NOTE — ED PROVIDER NOTE - CARE PLAN
Principal Discharge DX:	Altered mental status   Principal Discharge DX:	Altered mental status  Secondary Diagnosis:	Hypoglycemia

## 2020-12-24 NOTE — ED ADULT NURSE NOTE - OBJECTIVE STATEMENT
Pt. BIBEMS for AMS. Pt. is a poor historian on exam, slurring his words and drowsy. Pt. admits to drinking today. Unable to obtain more information at this time. Pt. has non labored breathing with no use of accessory muscle, nasal flaring or cough. Pt. BIBEMS for AMS. Pt. is a poor historian on exam, slurring his words and drowsy. Pt. admits to drinking today. Unable to obtain more information at this time. Pt. has non labored breathing with no use of accessory muscle, nasal flaring or cough. FS 69. Labs sent and dextrose IV given. Pt. is resting/sleeping and calm.

## 2022-05-27 ENCOUNTER — HOSPITAL ENCOUNTER (INPATIENT)
Dept: HOSPITAL 74 - YASAS | Age: 52
LOS: 14 days | Discharge: HOME | DRG: 772 | End: 2022-06-10
Attending: PSYCHIATRY & NEUROLOGY | Admitting: ALLERGY & IMMUNOLOGY
Payer: COMMERCIAL

## 2022-05-27 VITALS — BODY MASS INDEX: 29.5 KG/M2

## 2022-05-27 DIAGNOSIS — F13.230: ICD-10-CM

## 2022-05-27 DIAGNOSIS — F12.10: ICD-10-CM

## 2022-05-27 DIAGNOSIS — Z98.890: ICD-10-CM

## 2022-05-27 DIAGNOSIS — F14.10: ICD-10-CM

## 2022-05-27 DIAGNOSIS — J06.9: ICD-10-CM

## 2022-05-27 DIAGNOSIS — F10.230: Primary | ICD-10-CM

## 2022-05-27 LAB
ALBUMIN SERPL-MCNC: 4.4 G/DL (ref 3.4–5)
ALP SERPL-CCNC: 100 U/L (ref 45–117)
ALT SERPL-CCNC: 32 U/L (ref 13–61)
ANION GAP SERPL CALC-SCNC: 8 MMOL/L (ref 8–16)
AST SERPL-CCNC: 26 U/L (ref 15–37)
BILIRUB SERPL-MCNC: 0.5 MG/DL (ref 0.2–1)
BUN SERPL-MCNC: 19.9 MG/DL (ref 7–18)
CALCIUM SERPL-MCNC: 8.9 MG/DL (ref 8.5–10.1)
CHLORIDE SERPL-SCNC: 106 MMOL/L (ref 98–107)
CO2 SERPL-SCNC: 23 MMOL/L (ref 21–32)
CREAT SERPL-MCNC: 1.1 MG/DL (ref 0.55–1.3)
DEPRECATED RDW RBC AUTO: 14.7 % (ref 11.9–15.9)
GLUCOSE SERPL-MCNC: 129 MG/DL (ref 74–106)
HCT VFR BLD CALC: 40.5 % (ref 35.4–49)
HGB BLD-MCNC: 13.5 GM/DL (ref 11.7–16.9)
MCH RBC QN AUTO: 28.5 PG (ref 25.7–33.7)
MCHC RBC AUTO-ENTMCNC: 33.3 G/DL (ref 32–35.9)
MCV RBC: 85.8 FL (ref 80–96)
PLATELET # BLD AUTO: 204 10^3/UL (ref 134–434)
PMV BLD: 8.5 FL (ref 7.5–11.1)
PROT SERPL-MCNC: 8.6 G/DL (ref 6.4–8.2)
RBC # BLD AUTO: 4.73 M/MM3 (ref 4–5.6)
SODIUM SERPL-SCNC: 137 MMOL/L (ref 136–145)
TREPONEMA PALLIDUM AB [UNITS/VOLUME] IN SERUM OR PLASMA BY IMMUNOASSAY: (no result)
WBC # BLD AUTO: 4.2 K/MM3 (ref 4–10)

## 2022-05-27 PROCEDURE — U0003 INFECTIOUS AGENT DETECTION BY NUCLEIC ACID (DNA OR RNA); SEVERE ACUTE RESPIRATORY SYNDROME CORONAVIRUS 2 (SARS-COV-2) (CORONAVIRUS DISEASE [COVID-19]), AMPLIFIED PROBE TECHNIQUE, MAKING USE OF HIGH THROUGHPUT TECHNOLOGIES AS DESCRIBED BY CMS-2020-01-R: HCPCS

## 2022-05-27 PROCEDURE — HZ42ZZZ GROUP COUNSELING FOR SUBSTANCE ABUSE TREATMENT, COGNITIVE-BEHAVIORAL: ICD-10-PCS | Performed by: PSYCHIATRY & NEUROLOGY

## 2022-05-27 PROCEDURE — U0005 INFEC AGEN DETEC AMPLI PROBE: HCPCS

## 2022-05-27 RX ADMIN — Medication SCH MG: at 21:20

## 2022-05-27 RX ADMIN — FLUTICASONE PROPIONATE SCH: 50 SPRAY, METERED NASAL at 19:10

## 2022-05-27 RX ADMIN — Medication SCH TAB: at 14:24

## 2022-05-27 RX ADMIN — HYDROXYZINE PAMOATE SCH MG: 25 CAPSULE ORAL at 18:24

## 2022-05-27 RX ADMIN — LORATADINE SCH MG: 10 TABLET ORAL at 18:24

## 2022-05-27 RX ADMIN — HYDROXYZINE PAMOATE SCH: 25 CAPSULE ORAL at 14:20

## 2022-05-27 RX ADMIN — FLUTICASONE PROPIONATE SCH SPRAYS: 50 SPRAY, METERED NASAL at 21:19

## 2022-05-27 RX ADMIN — HYDROXYZINE PAMOATE SCH MG: 25 CAPSULE ORAL at 14:25

## 2022-05-27 RX ADMIN — HYDROXYZINE PAMOATE SCH MG: 25 CAPSULE ORAL at 21:20

## 2022-05-28 RX ADMIN — PHENOL PRN EACH: 14.5 LOZENGE ORAL at 14:18

## 2022-05-28 RX ADMIN — HYDROXYZINE PAMOATE SCH MG: 25 CAPSULE ORAL at 10:11

## 2022-05-28 RX ADMIN — HYDROXYZINE PAMOATE SCH MG: 25 CAPSULE ORAL at 21:11

## 2022-05-28 RX ADMIN — Medication SCH MG: at 21:11

## 2022-05-28 RX ADMIN — PHENOL PRN EACH: 14.5 LOZENGE ORAL at 21:12

## 2022-05-28 RX ADMIN — FLUTICASONE PROPIONATE SCH SPRAYS: 50 SPRAY, METERED NASAL at 10:11

## 2022-05-28 RX ADMIN — HYDROXYZINE PAMOATE SCH MG: 25 CAPSULE ORAL at 06:00

## 2022-05-28 RX ADMIN — LORATADINE SCH MG: 10 TABLET ORAL at 10:11

## 2022-05-28 RX ADMIN — PHENOL PRN EACH: 14.5 LOZENGE ORAL at 10:13

## 2022-05-28 RX ADMIN — Medication SCH TAB: at 10:12

## 2022-05-28 RX ADMIN — HYDROXYZINE PAMOATE SCH: 25 CAPSULE ORAL at 18:45

## 2022-05-28 RX ADMIN — HYDROXYZINE PAMOATE SCH: 25 CAPSULE ORAL at 14:18

## 2022-05-28 RX ADMIN — Medication SCH: at 21:12

## 2022-05-28 RX ADMIN — FLUTICASONE PROPIONATE SCH SPRAYS: 50 SPRAY, METERED NASAL at 21:12

## 2022-05-29 RX ADMIN — Medication SCH: at 23:32

## 2022-05-29 RX ADMIN — IBUPROFEN PRN MG: 400 TABLET, FILM COATED ORAL at 15:58

## 2022-05-29 RX ADMIN — ACETAMINOPHEN PRN MG: 325 TABLET ORAL at 11:10

## 2022-05-29 RX ADMIN — HYDROXYZINE PAMOATE SCH: 25 CAPSULE ORAL at 23:32

## 2022-05-29 RX ADMIN — PHENOL PRN EACH: 14.5 LOZENGE ORAL at 21:42

## 2022-05-29 RX ADMIN — IBUPROFEN PRN MG: 400 TABLET, FILM COATED ORAL at 21:39

## 2022-05-29 RX ADMIN — HYDROXYZINE PAMOATE SCH: 25 CAPSULE ORAL at 13:23

## 2022-05-29 RX ADMIN — ALUMINUM HYDROXIDE, MAGNESIUM HYDROXIDE, AND SIMETHICONE PRN ML: 200; 200; 20 SUSPENSION ORAL at 10:20

## 2022-05-29 RX ADMIN — LORATADINE SCH MG: 10 TABLET ORAL at 09:56

## 2022-05-29 RX ADMIN — HYDROXYZINE PAMOATE SCH: 25 CAPSULE ORAL at 17:40

## 2022-05-29 RX ADMIN — Medication SCH TAB: at 09:56

## 2022-05-29 RX ADMIN — PHENOL PRN EACH: 14.5 LOZENGE ORAL at 09:57

## 2022-05-29 RX ADMIN — FLUTICASONE PROPIONATE SCH SPRAYS: 50 SPRAY, METERED NASAL at 09:56

## 2022-05-29 RX ADMIN — HYDROXYZINE PAMOATE SCH: 25 CAPSULE ORAL at 06:56

## 2022-05-29 RX ADMIN — Medication SCH: at 23:31

## 2022-05-29 RX ADMIN — FLUTICASONE PROPIONATE SCH SPRAYS: 50 SPRAY, METERED NASAL at 21:38

## 2022-05-29 RX ADMIN — IBUPROFEN PRN MG: 400 TABLET, FILM COATED ORAL at 06:55

## 2022-05-29 RX ADMIN — HYDROXYZINE PAMOATE SCH MG: 25 CAPSULE ORAL at 09:56

## 2022-05-30 RX ADMIN — IBUPROFEN PRN MG: 400 TABLET, FILM COATED ORAL at 21:40

## 2022-05-30 RX ADMIN — HYDROXYZINE PAMOATE SCH: 25 CAPSULE ORAL at 10:21

## 2022-05-30 RX ADMIN — LORATADINE SCH MG: 10 TABLET ORAL at 10:20

## 2022-05-30 RX ADMIN — Medication SCH MG: at 21:40

## 2022-05-30 RX ADMIN — IBUPROFEN PRN MG: 400 TABLET, FILM COATED ORAL at 06:39

## 2022-05-30 RX ADMIN — Medication SCH: at 21:39

## 2022-05-30 RX ADMIN — PHENOL PRN EACH: 14.5 LOZENGE ORAL at 10:24

## 2022-05-30 RX ADMIN — HYDROXYZINE PAMOATE SCH: 25 CAPSULE ORAL at 14:05

## 2022-05-30 RX ADMIN — PHENOL PRN EACH: 14.5 LOZENGE ORAL at 21:41

## 2022-05-30 RX ADMIN — HYDROXYZINE PAMOATE SCH: 25 CAPSULE ORAL at 21:39

## 2022-05-30 RX ADMIN — HYDROXYZINE PAMOATE SCH: 25 CAPSULE ORAL at 06:38

## 2022-05-30 RX ADMIN — FLUTICASONE PROPIONATE SCH SPRAYS: 50 SPRAY, METERED NASAL at 21:39

## 2022-05-30 RX ADMIN — ACETAMINOPHEN PRN MG: 325 TABLET ORAL at 10:23

## 2022-05-30 RX ADMIN — HYDROXYZINE PAMOATE SCH: 25 CAPSULE ORAL at 19:29

## 2022-05-30 RX ADMIN — FLUTICASONE PROPIONATE SCH SPRAYS: 50 SPRAY, METERED NASAL at 10:21

## 2022-05-30 RX ADMIN — Medication SCH TAB: at 10:20

## 2022-05-31 RX ADMIN — HYDROXYZINE PAMOATE SCH: 25 CAPSULE ORAL at 21:35

## 2022-05-31 RX ADMIN — HYDROXYZINE PAMOATE SCH: 25 CAPSULE ORAL at 06:41

## 2022-05-31 RX ADMIN — HYDROXYZINE PAMOATE SCH: 25 CAPSULE ORAL at 14:58

## 2022-05-31 RX ADMIN — Medication SCH: at 21:35

## 2022-05-31 RX ADMIN — FLUTICASONE PROPIONATE SCH SPRAYS: 50 SPRAY, METERED NASAL at 10:37

## 2022-05-31 RX ADMIN — FLUTICASONE PROPIONATE SCH SPRAYS: 50 SPRAY, METERED NASAL at 21:36

## 2022-05-31 RX ADMIN — HYDROXYZINE PAMOATE SCH: 25 CAPSULE ORAL at 10:38

## 2022-05-31 RX ADMIN — ACETAMINOPHEN PRN MG: 325 TABLET ORAL at 18:47

## 2022-05-31 RX ADMIN — Medication SCH TAB: at 10:38

## 2022-05-31 RX ADMIN — LORATADINE SCH MG: 10 TABLET ORAL at 10:38

## 2022-05-31 RX ADMIN — HYDROXYZINE PAMOATE SCH: 25 CAPSULE ORAL at 17:54

## 2022-06-01 RX ADMIN — Medication SCH TAB: at 10:44

## 2022-06-01 RX ADMIN — FLUTICASONE PROPIONATE SCH: 50 SPRAY, METERED NASAL at 22:01

## 2022-06-01 RX ADMIN — Medication SCH: at 22:01

## 2022-06-01 RX ADMIN — HYDROXYZINE PAMOATE SCH: 25 CAPSULE ORAL at 06:39

## 2022-06-01 RX ADMIN — HYDROXYZINE PAMOATE SCH: 25 CAPSULE ORAL at 13:19

## 2022-06-01 RX ADMIN — FLUTICASONE PROPIONATE SCH SPRAYS: 50 SPRAY, METERED NASAL at 10:44

## 2022-06-01 RX ADMIN — LORATADINE SCH: 10 TABLET ORAL at 10:44

## 2022-06-01 RX ADMIN — HYDROXYZINE PAMOATE SCH: 25 CAPSULE ORAL at 10:45

## 2022-06-01 RX ADMIN — HYDROXYZINE PAMOATE SCH: 25 CAPSULE ORAL at 18:14

## 2022-06-01 RX ADMIN — PHENOL PRN EACH: 14.5 LOZENGE ORAL at 10:48

## 2022-06-01 RX ADMIN — HYDROXYZINE PAMOATE SCH: 25 CAPSULE ORAL at 22:01

## 2022-06-01 RX ADMIN — ACETAMINOPHEN PRN MG: 325 TABLET ORAL at 18:13

## 2022-06-01 RX ADMIN — ALUMINUM HYDROXIDE, MAGNESIUM HYDROXIDE, AND SIMETHICONE PRN ML: 200; 200; 20 SUSPENSION ORAL at 18:13

## 2022-06-01 RX ADMIN — IBUPROFEN PRN MG: 400 TABLET, FILM COATED ORAL at 10:45

## 2022-06-02 RX ADMIN — IBUPROFEN PRN MG: 400 TABLET, FILM COATED ORAL at 21:22

## 2022-06-02 RX ADMIN — FLUTICASONE PROPIONATE SCH: 50 SPRAY, METERED NASAL at 21:23

## 2022-06-02 RX ADMIN — Medication SCH TAB: at 10:06

## 2022-06-02 RX ADMIN — HYDROXYZINE PAMOATE SCH: 25 CAPSULE ORAL at 10:08

## 2022-06-02 RX ADMIN — HYDROXYZINE PAMOATE SCH: 25 CAPSULE ORAL at 18:50

## 2022-06-02 RX ADMIN — ACETAMINOPHEN PRN MG: 325 TABLET ORAL at 08:27

## 2022-06-02 RX ADMIN — HYDROXYZINE PAMOATE SCH: 25 CAPSULE ORAL at 21:24

## 2022-06-02 RX ADMIN — HYDROXYZINE PAMOATE SCH: 25 CAPSULE ORAL at 14:15

## 2022-06-02 RX ADMIN — Medication SCH: at 21:23

## 2022-06-02 RX ADMIN — HYDROXYZINE PAMOATE SCH: 25 CAPSULE ORAL at 06:31

## 2022-06-02 RX ADMIN — Medication SCH MG: at 21:22

## 2022-06-02 RX ADMIN — LORATADINE SCH MG: 10 TABLET ORAL at 10:06

## 2022-06-02 RX ADMIN — FLUTICASONE PROPIONATE SCH SPRAYS: 50 SPRAY, METERED NASAL at 10:07

## 2022-06-03 RX ADMIN — Medication SCH: at 21:43

## 2022-06-03 RX ADMIN — FLUTICASONE PROPIONATE SCH SPRAYS: 50 SPRAY, METERED NASAL at 09:59

## 2022-06-03 RX ADMIN — FLUTICASONE PROPIONATE SCH: 50 SPRAY, METERED NASAL at 21:42

## 2022-06-03 RX ADMIN — LORATADINE SCH MG: 10 TABLET ORAL at 09:59

## 2022-06-03 RX ADMIN — Medication SCH TAB: at 09:58

## 2022-06-03 RX ADMIN — ALUMINUM HYDROXIDE, MAGNESIUM HYDROXIDE, AND SIMETHICONE PRN ML: 200; 200; 20 SUSPENSION ORAL at 22:02

## 2022-06-03 RX ADMIN — Medication SCH: at 21:42

## 2022-06-03 RX ADMIN — HYDROXYZINE PAMOATE SCH: 25 CAPSULE ORAL at 07:15

## 2022-06-04 RX ADMIN — IBUPROFEN PRN MG: 400 TABLET, FILM COATED ORAL at 05:58

## 2022-06-04 RX ADMIN — IBUPROFEN PRN MG: 400 TABLET, FILM COATED ORAL at 21:25

## 2022-06-04 RX ADMIN — ALUMINUM HYDROXIDE, MAGNESIUM HYDROXIDE, AND SIMETHICONE PRN ML: 200; 200; 20 SUSPENSION ORAL at 18:12

## 2022-06-04 RX ADMIN — PHENOL PRN EACH: 14.5 LOZENGE ORAL at 09:49

## 2022-06-04 RX ADMIN — FLUTICASONE PROPIONATE SCH SPRAYS: 50 SPRAY, METERED NASAL at 09:47

## 2022-06-04 RX ADMIN — LORATADINE SCH MG: 10 TABLET ORAL at 09:47

## 2022-06-04 RX ADMIN — Medication SCH MG: at 21:24

## 2022-06-04 RX ADMIN — Medication SCH TAB: at 09:47

## 2022-06-04 RX ADMIN — FLUTICASONE PROPIONATE SCH SPRAYS: 50 SPRAY, METERED NASAL at 21:25

## 2022-06-04 RX ADMIN — Medication SCH MG: at 21:25

## 2022-06-05 RX ADMIN — FLUTICASONE PROPIONATE SCH: 50 SPRAY, METERED NASAL at 21:37

## 2022-06-05 RX ADMIN — FLUTICASONE PROPIONATE SCH SPRAYS: 50 SPRAY, METERED NASAL at 10:06

## 2022-06-05 RX ADMIN — LORATADINE SCH MG: 10 TABLET ORAL at 10:06

## 2022-06-05 RX ADMIN — ACETAMINOPHEN PRN MG: 325 TABLET ORAL at 05:54

## 2022-06-05 RX ADMIN — Medication SCH: at 21:37

## 2022-06-05 RX ADMIN — Medication SCH TAB: at 10:06

## 2022-06-06 RX ADMIN — FLUTICASONE PROPIONATE SCH: 50 SPRAY, METERED NASAL at 21:55

## 2022-06-06 RX ADMIN — Medication SCH: at 21:56

## 2022-06-06 RX ADMIN — Medication SCH TAB: at 10:08

## 2022-06-06 RX ADMIN — LORATADINE SCH MG: 10 TABLET ORAL at 10:08

## 2022-06-06 RX ADMIN — FLUTICASONE PROPIONATE SCH SPRAYS: 50 SPRAY, METERED NASAL at 10:08

## 2022-06-07 RX ADMIN — Medication SCH: at 22:35

## 2022-06-07 RX ADMIN — FLUTICASONE PROPIONATE SCH: 50 SPRAY, METERED NASAL at 22:35

## 2022-06-07 RX ADMIN — LORATADINE SCH MG: 10 TABLET ORAL at 10:03

## 2022-06-07 RX ADMIN — FLUTICASONE PROPIONATE SCH SPRAYS: 50 SPRAY, METERED NASAL at 10:03

## 2022-06-07 RX ADMIN — Medication SCH TAB: at 10:03

## 2022-06-07 RX ADMIN — IBUPROFEN PRN MG: 400 TABLET, FILM COATED ORAL at 10:04

## 2022-06-08 RX ADMIN — FLUTICASONE PROPIONATE SCH SPRAYS: 50 SPRAY, METERED NASAL at 21:13

## 2022-06-08 RX ADMIN — Medication SCH: at 21:13

## 2022-06-08 RX ADMIN — IBUPROFEN PRN MG: 400 TABLET, FILM COATED ORAL at 21:12

## 2022-06-08 RX ADMIN — FLUTICASONE PROPIONATE SCH SPRAYS: 50 SPRAY, METERED NASAL at 09:52

## 2022-06-08 RX ADMIN — Medication SCH TAB: at 09:51

## 2022-06-08 RX ADMIN — LORATADINE SCH MG: 10 TABLET ORAL at 09:51

## 2022-06-09 RX ADMIN — Medication SCH TAB: at 11:01

## 2022-06-09 RX ADMIN — LORATADINE SCH MG: 10 TABLET ORAL at 11:01

## 2022-06-09 RX ADMIN — FLUTICASONE PROPIONATE SCH SPRAYS: 50 SPRAY, METERED NASAL at 11:02

## 2022-06-09 RX ADMIN — Medication SCH: at 21:19

## 2022-06-09 RX ADMIN — FLUTICASONE PROPIONATE SCH: 50 SPRAY, METERED NASAL at 21:19

## 2022-06-10 VITALS — TEMPERATURE: 97.5 F | DIASTOLIC BLOOD PRESSURE: 93 MMHG | SYSTOLIC BLOOD PRESSURE: 142 MMHG | HEART RATE: 67 BPM

## 2022-06-10 RX ADMIN — Medication SCH TAB: at 10:35

## 2022-06-10 RX ADMIN — LORATADINE SCH MG: 10 TABLET ORAL at 10:34

## 2022-06-10 RX ADMIN — FLUTICASONE PROPIONATE SCH SPRAYS: 50 SPRAY, METERED NASAL at 10:34

## 2022-09-07 NOTE — ED ADULT NURSE NOTE - NSFALLRSKASSESASSIST_ED_ALL_ED
Patient has medicare, should be able to get any strips under medicare b.  Also the script is written for generic, not one touch.    No pa needed   yes

## 2022-09-16 ENCOUNTER — HOSPITAL ENCOUNTER (INPATIENT)
Dept: HOSPITAL 74 - YASAS | Age: 52
LOS: 14 days | Discharge: HOME | DRG: 772 | End: 2022-09-30
Attending: PSYCHIATRY & NEUROLOGY | Admitting: ALLERGY & IMMUNOLOGY
Payer: COMMERCIAL

## 2022-09-16 VITALS — BODY MASS INDEX: 30.1 KG/M2

## 2022-09-16 DIAGNOSIS — I10: ICD-10-CM

## 2022-09-16 DIAGNOSIS — M54.89: ICD-10-CM

## 2022-09-16 DIAGNOSIS — F14.20: ICD-10-CM

## 2022-09-16 DIAGNOSIS — J45.990: ICD-10-CM

## 2022-09-16 DIAGNOSIS — H04.123: ICD-10-CM

## 2022-09-16 DIAGNOSIS — F10.20: Primary | ICD-10-CM

## 2022-09-16 DIAGNOSIS — F12.20: ICD-10-CM

## 2022-09-16 PROCEDURE — HZ42ZZZ GROUP COUNSELING FOR SUBSTANCE ABUSE TREATMENT, COGNITIVE-BEHAVIORAL: ICD-10-PCS | Performed by: PSYCHIATRY & NEUROLOGY

## 2022-09-16 PROCEDURE — U0003 INFECTIOUS AGENT DETECTION BY NUCLEIC ACID (DNA OR RNA); SEVERE ACUTE RESPIRATORY SYNDROME CORONAVIRUS 2 (SARS-COV-2) (CORONAVIRUS DISEASE [COVID-19]), AMPLIFIED PROBE TECHNIQUE, MAKING USE OF HIGH THROUGHPUT TECHNOLOGIES AS DESCRIBED BY CMS-2020-01-R: HCPCS

## 2022-09-16 PROCEDURE — U0005 INFEC AGEN DETEC AMPLI PROBE: HCPCS

## 2022-09-17 LAB
ALBUMIN SERPL-MCNC: 4 G/DL (ref 3.4–5)
ALP SERPL-CCNC: 78 U/L (ref 45–117)
ALT SERPL-CCNC: 43 U/L (ref 13–61)
ANION GAP SERPL CALC-SCNC: 7 MMOL/L (ref 8–16)
APPEARANCE UR: CLEAR
AST SERPL-CCNC: 29 U/L (ref 15–37)
BILIRUB SERPL-MCNC: 0.4 MG/DL (ref 0.2–1)
BILIRUB UR STRIP.AUTO-MCNC: NEGATIVE MG/DL
BUN SERPL-MCNC: 16.6 MG/DL (ref 7–18)
CALCIUM SERPL-MCNC: 8.8 MG/DL (ref 8.5–10.1)
CHLORIDE SERPL-SCNC: 109 MMOL/L (ref 98–107)
CO2 SERPL-SCNC: 23 MMOL/L (ref 21–32)
COLOR UR: YELLOW
CREAT SERPL-MCNC: 1 MG/DL (ref 0.55–1.3)
DEPRECATED RDW RBC AUTO: 15.8 % (ref 11.9–15.9)
GLUCOSE SERPL-MCNC: 96 MG/DL (ref 74–106)
HCT VFR BLD CALC: 36.8 % (ref 35.4–49)
HGB BLD-MCNC: 12.5 GM/DL (ref 11.7–16.9)
KETONES UR QL STRIP: NEGATIVE
LEUKOCYTE ESTERASE UR QL STRIP.AUTO: NEGATIVE
MCH RBC QN AUTO: 29.6 PG (ref 25.7–33.7)
MCHC RBC AUTO-ENTMCNC: 33.9 G/DL (ref 32–35.9)
MCV RBC: 87.2 FL (ref 80–96)
NITRITE UR QL STRIP: NEGATIVE
PH UR: 6.5 [PH] (ref 5–8)
PLATELET # BLD AUTO: 201 10^3/UL (ref 134–434)
PMV BLD: 8.4 FL (ref 7.5–11.1)
PROT SERPL-MCNC: 7.7 G/DL (ref 6.4–8.2)
PROT UR QL STRIP: NEGATIVE
PROT UR QL STRIP: NEGATIVE
RBC # BLD AUTO: 4.22 M/MM3 (ref 4–5.6)
SODIUM SERPL-SCNC: 140 MMOL/L (ref 136–145)
SP GR UR: 1.02 (ref 1.01–1.03)
TREPONEMA PALLIDUM AB [UNITS/VOLUME] IN SERUM OR PLASMA BY IMMUNOASSAY: (no result)
UROBILINOGEN UR STRIP-MCNC: 0.2 MG/DL (ref 0.2–1)
WBC # BLD AUTO: 3.6 K/MM3 (ref 4–10)

## 2022-09-17 RX ADMIN — Medication SCH: at 21:57

## 2022-09-17 RX ADMIN — Medication SCH TAB: at 10:05

## 2022-09-17 RX ADMIN — ACETAMINOPHEN PRN MG: 325 TABLET ORAL at 10:05

## 2022-09-18 RX ADMIN — Medication SCH: at 21:41

## 2022-09-18 RX ADMIN — IBUPROFEN PRN MG: 400 TABLET, FILM COATED ORAL at 09:45

## 2022-09-18 RX ADMIN — Medication SCH TAB: at 09:44

## 2022-09-19 RX ADMIN — Medication SCH MG: at 21:28

## 2022-09-19 RX ADMIN — Medication SCH TAB: at 10:03

## 2022-09-19 RX ADMIN — CLOTRIMAZOLE AND BETAMETHASONE DIPROPIONATE SCH APPLIC: 10; .5 CREAM TOPICAL at 10:41

## 2022-09-19 RX ADMIN — IBUPROFEN PRN MG: 400 TABLET, FILM COATED ORAL at 10:04

## 2022-09-19 RX ADMIN — ACETAMINOPHEN PRN MG: 325 TABLET ORAL at 19:12

## 2022-09-19 RX ADMIN — POLYVINYL ALCOHOL PRN DROP: 14 SOLUTION/ DROPS OPHTHALMIC at 10:42

## 2022-09-19 RX ADMIN — CLOTRIMAZOLE AND BETAMETHASONE DIPROPIONATE SCH APPLIC: 10; .5 CREAM TOPICAL at 21:29

## 2022-09-20 RX ADMIN — POLYVINYL ALCOHOL PRN DROP: 14 SOLUTION/ DROPS OPHTHALMIC at 06:36

## 2022-09-20 RX ADMIN — Medication SCH MG: at 21:53

## 2022-09-20 RX ADMIN — Medication SCH TAB: at 10:00

## 2022-09-20 RX ADMIN — IBUPROFEN PRN MG: 400 TABLET, FILM COATED ORAL at 09:59

## 2022-09-20 RX ADMIN — CLOTRIMAZOLE AND BETAMETHASONE DIPROPIONATE SCH: 10; .5 CREAM TOPICAL at 21:54

## 2022-09-20 RX ADMIN — CLOTRIMAZOLE AND BETAMETHASONE DIPROPIONATE SCH: 10; .5 CREAM TOPICAL at 10:00

## 2022-09-20 RX ADMIN — Medication SCH MG: at 21:54

## 2022-09-21 RX ADMIN — CLOTRIMAZOLE AND BETAMETHASONE DIPROPIONATE SCH: 10; .5 CREAM TOPICAL at 09:49

## 2022-09-21 RX ADMIN — IBUPROFEN PRN MG: 400 TABLET, FILM COATED ORAL at 09:50

## 2022-09-21 RX ADMIN — Medication SCH TAB: at 09:49

## 2022-09-21 RX ADMIN — Medication SCH: at 21:28

## 2022-09-21 RX ADMIN — CLOTRIMAZOLE AND BETAMETHASONE DIPROPIONATE SCH: 10; .5 CREAM TOPICAL at 21:28

## 2022-09-21 RX ADMIN — POLYVINYL ALCOHOL PRN DROP: 14 SOLUTION/ DROPS OPHTHALMIC at 09:51

## 2022-09-22 RX ADMIN — Medication SCH TAB: at 10:02

## 2022-09-22 RX ADMIN — Medication SCH: at 23:07

## 2022-09-22 RX ADMIN — Medication SCH: at 23:08

## 2022-09-22 RX ADMIN — CLOTRIMAZOLE AND BETAMETHASONE DIPROPIONATE SCH: 10; .5 CREAM TOPICAL at 23:07

## 2022-09-22 RX ADMIN — CLOTRIMAZOLE AND BETAMETHASONE DIPROPIONATE SCH APPLIC: 10; .5 CREAM TOPICAL at 10:02

## 2022-09-22 RX ADMIN — POLYVINYL ALCOHOL PRN DROP: 14 SOLUTION/ DROPS OPHTHALMIC at 10:02

## 2022-09-22 RX ADMIN — IBUPROFEN PRN MG: 400 TABLET, FILM COATED ORAL at 10:02

## 2022-09-23 RX ADMIN — CLOTRIMAZOLE AND BETAMETHASONE DIPROPIONATE SCH: 10; .5 CREAM TOPICAL at 09:55

## 2022-09-23 RX ADMIN — Medication SCH: at 21:53

## 2022-09-23 RX ADMIN — ACETAMINOPHEN PRN MG: 325 TABLET ORAL at 09:56

## 2022-09-23 RX ADMIN — CLOTRIMAZOLE AND BETAMETHASONE DIPROPIONATE SCH: 10; .5 CREAM TOPICAL at 21:53

## 2022-09-23 RX ADMIN — IBUPROFEN PRN MG: 400 TABLET, FILM COATED ORAL at 06:16

## 2022-09-23 RX ADMIN — POLYVINYL ALCOHOL PRN DROP: 14 SOLUTION/ DROPS OPHTHALMIC at 09:56

## 2022-09-23 RX ADMIN — Medication SCH TAB: at 09:56

## 2022-09-24 RX ADMIN — POLYVINYL ALCOHOL PRN DROP: 14 SOLUTION/ DROPS OPHTHALMIC at 09:53

## 2022-09-24 RX ADMIN — CLOTRIMAZOLE AND BETAMETHASONE DIPROPIONATE SCH: 10; .5 CREAM TOPICAL at 09:53

## 2022-09-24 RX ADMIN — ACETAMINOPHEN PRN MG: 325 TABLET ORAL at 06:48

## 2022-09-24 RX ADMIN — IBUPROFEN PRN MG: 400 TABLET, FILM COATED ORAL at 13:08

## 2022-09-24 RX ADMIN — Medication SCH MG: at 21:12

## 2022-09-24 RX ADMIN — CLOTRIMAZOLE AND BETAMETHASONE DIPROPIONATE SCH: 10; .5 CREAM TOPICAL at 21:12

## 2022-09-24 RX ADMIN — Medication SCH TAB: at 09:53

## 2022-09-25 RX ADMIN — POLYVINYL ALCOHOL PRN DROP: 14 SOLUTION/ DROPS OPHTHALMIC at 10:05

## 2022-09-25 RX ADMIN — Medication SCH MG: at 21:17

## 2022-09-25 RX ADMIN — ACETAMINOPHEN PRN MG: 325 TABLET ORAL at 06:45

## 2022-09-25 RX ADMIN — Medication SCH TAB: at 10:05

## 2022-09-25 RX ADMIN — CLOTRIMAZOLE AND BETAMETHASONE DIPROPIONATE SCH: 10; .5 CREAM TOPICAL at 10:05

## 2022-09-25 RX ADMIN — CLOTRIMAZOLE AND BETAMETHASONE DIPROPIONATE SCH: 10; .5 CREAM TOPICAL at 21:17

## 2022-09-26 RX ADMIN — Medication SCH MG: at 21:23

## 2022-09-26 RX ADMIN — Medication SCH TAB: at 09:53

## 2022-09-26 RX ADMIN — CLOTRIMAZOLE AND BETAMETHASONE DIPROPIONATE SCH: 10; .5 CREAM TOPICAL at 21:24

## 2022-09-26 RX ADMIN — IBUPROFEN PRN MG: 400 TABLET, FILM COATED ORAL at 09:54

## 2022-09-26 RX ADMIN — CLOTRIMAZOLE AND BETAMETHASONE DIPROPIONATE SCH: 10; .5 CREAM TOPICAL at 09:53

## 2022-09-27 RX ADMIN — Medication SCH: at 21:50

## 2022-09-27 RX ADMIN — Medication SCH TAB: at 09:58

## 2022-09-27 RX ADMIN — CLOTRIMAZOLE AND BETAMETHASONE DIPROPIONATE SCH APPLIC: 10; .5 CREAM TOPICAL at 10:00

## 2022-09-27 RX ADMIN — CLOTRIMAZOLE AND BETAMETHASONE DIPROPIONATE SCH: 10; .5 CREAM TOPICAL at 21:50

## 2022-09-27 RX ADMIN — POLYVINYL ALCOHOL PRN DROP: 14 SOLUTION/ DROPS OPHTHALMIC at 10:00

## 2022-09-27 RX ADMIN — IBUPROFEN PRN MG: 400 TABLET, FILM COATED ORAL at 09:58

## 2022-09-28 RX ADMIN — Medication SCH TAB: at 10:09

## 2022-09-28 RX ADMIN — Medication SCH: at 21:40

## 2022-09-28 RX ADMIN — IBUPROFEN PRN MG: 400 TABLET, FILM COATED ORAL at 10:09

## 2022-09-28 RX ADMIN — CLOTRIMAZOLE AND BETAMETHASONE DIPROPIONATE SCH: 10; .5 CREAM TOPICAL at 21:40

## 2022-09-28 RX ADMIN — CLOTRIMAZOLE AND BETAMETHASONE DIPROPIONATE SCH: 10; .5 CREAM TOPICAL at 10:11

## 2022-09-28 RX ADMIN — POLYVINYL ALCOHOL PRN DROP: 14 SOLUTION/ DROPS OPHTHALMIC at 10:11

## 2022-09-29 RX ADMIN — CLOTRIMAZOLE AND BETAMETHASONE DIPROPIONATE SCH: 10; .5 CREAM TOPICAL at 21:33

## 2022-09-29 RX ADMIN — Medication SCH TAB: at 09:58

## 2022-09-29 RX ADMIN — Medication SCH: at 21:33

## 2022-09-29 RX ADMIN — CLOTRIMAZOLE AND BETAMETHASONE DIPROPIONATE SCH: 10; .5 CREAM TOPICAL at 09:58

## 2022-09-29 RX ADMIN — IBUPROFEN PRN MG: 400 TABLET, FILM COATED ORAL at 09:58

## 2022-09-30 VITALS
HEART RATE: 87 BPM | SYSTOLIC BLOOD PRESSURE: 124 MMHG | RESPIRATION RATE: 16 BRPM | TEMPERATURE: 96.8 F | DIASTOLIC BLOOD PRESSURE: 81 MMHG

## 2022-09-30 RX ADMIN — CLOTRIMAZOLE AND BETAMETHASONE DIPROPIONATE SCH: 10; .5 CREAM TOPICAL at 09:15

## 2022-09-30 RX ADMIN — Medication SCH TAB: at 09:15

## 2023-02-10 ENCOUNTER — HOSPITAL ENCOUNTER (INPATIENT)
Dept: HOSPITAL 74 - YASAS | Age: 53
LOS: 4 days | Discharge: HOME | End: 2023-02-14
Attending: SURGERY | Admitting: ALLERGY & IMMUNOLOGY
Payer: COMMERCIAL

## 2023-02-10 VITALS — BODY MASS INDEX: 32 KG/M2

## 2023-02-10 DIAGNOSIS — I10: ICD-10-CM

## 2023-02-10 DIAGNOSIS — R19.7: ICD-10-CM

## 2023-02-10 DIAGNOSIS — Z86.2: ICD-10-CM

## 2023-02-10 DIAGNOSIS — L85.3: ICD-10-CM

## 2023-02-10 DIAGNOSIS — F12.20: ICD-10-CM

## 2023-02-10 DIAGNOSIS — F10.230: Primary | ICD-10-CM

## 2023-02-10 DIAGNOSIS — E72.20: ICD-10-CM

## 2023-02-10 PROCEDURE — U0005 INFEC AGEN DETEC AMPLI PROBE: HCPCS

## 2023-02-10 PROCEDURE — HZ2ZZZZ DETOXIFICATION SERVICES FOR SUBSTANCE ABUSE TREATMENT: ICD-10-PCS | Performed by: SURGERY

## 2023-02-10 PROCEDURE — U0003 INFECTIOUS AGENT DETECTION BY NUCLEIC ACID (DNA OR RNA); SEVERE ACUTE RESPIRATORY SYNDROME CORONAVIRUS 2 (SARS-COV-2) (CORONAVIRUS DISEASE [COVID-19]), AMPLIFIED PROBE TECHNIQUE, MAKING USE OF HIGH THROUGHPUT TECHNOLOGIES AS DESCRIBED BY CMS-2020-01-R: HCPCS

## 2023-02-10 RX ADMIN — Medication SCH MG: at 22:00

## 2023-02-10 RX ADMIN — HYDROXYZINE PAMOATE PRN MG: 25 CAPSULE ORAL at 17:29

## 2023-02-10 RX ADMIN — TOLNAFTATE SCH: 10 CREAM TOPICAL at 22:01

## 2023-02-10 RX ADMIN — Medication SCH TAB: at 15:20

## 2023-02-10 RX ADMIN — ACETAMINOPHEN PRN MG: 325 TABLET ORAL at 15:21

## 2023-02-11 RX ADMIN — METHOCARBAMOL PRN MG: 500 TABLET ORAL at 22:31

## 2023-02-11 RX ADMIN — Medication SCH MG: at 22:29

## 2023-02-11 RX ADMIN — ACETAMINOPHEN PRN MG: 325 TABLET ORAL at 17:52

## 2023-02-11 RX ADMIN — HYDROXYZINE PAMOATE PRN MG: 25 CAPSULE ORAL at 17:50

## 2023-02-11 RX ADMIN — TOLNAFTATE SCH: 10 CREAM TOPICAL at 22:29

## 2023-02-11 RX ADMIN — Medication SCH TAB: at 10:19

## 2023-02-11 RX ADMIN — TOLNAFTATE SCH APPLIC: 10 CREAM TOPICAL at 10:19

## 2023-02-12 RX ADMIN — TOLNAFTATE SCH: 10 CREAM TOPICAL at 10:43

## 2023-02-12 RX ADMIN — METHOCARBAMOL PRN MG: 500 TABLET ORAL at 06:08

## 2023-02-12 RX ADMIN — HYDROXYZINE PAMOATE PRN MG: 25 CAPSULE ORAL at 06:08

## 2023-02-12 RX ADMIN — TOLNAFTATE SCH: 10 CREAM TOPICAL at 22:02

## 2023-02-12 RX ADMIN — METHOCARBAMOL PRN MG: 500 TABLET ORAL at 22:02

## 2023-02-12 RX ADMIN — Medication SCH TAB: at 10:42

## 2023-02-12 RX ADMIN — HYDROXYZINE PAMOATE PRN MG: 25 CAPSULE ORAL at 22:02

## 2023-02-12 RX ADMIN — Medication SCH MG: at 22:01

## 2023-02-12 RX ADMIN — Medication SCH MG: at 22:02

## 2023-02-12 RX ADMIN — POLYVINYL ALCOHOL PRN DROP: 14 SOLUTION/ DROPS OPHTHALMIC at 21:26

## 2023-02-13 LAB
ALBUMIN SERPL-MCNC: 3.9 G/DL (ref 3.4–5)
ALP SERPL-CCNC: 85 U/L (ref 45–117)
ALT SERPL-CCNC: 25 U/L (ref 13–61)
ANION GAP SERPL CALC-SCNC: 7 MMOL/L (ref 8–16)
AST SERPL-CCNC: 22 U/L (ref 15–37)
BILIRUB SERPL-MCNC: 0.5 MG/DL (ref 0.2–1)
BUN SERPL-MCNC: 14.1 MG/DL (ref 7–18)
CALCIUM SERPL-MCNC: 9 MG/DL (ref 8.5–10.1)
CHLORIDE SERPL-SCNC: 104 MMOL/L (ref 98–107)
CO2 SERPL-SCNC: 25 MMOL/L (ref 21–32)
CREAT SERPL-MCNC: 1 MG/DL (ref 0.55–1.3)
DEPRECATED RDW RBC AUTO: 14.1 % (ref 11.9–15.9)
GLUCOSE SERPL-MCNC: 120 MG/DL (ref 74–106)
HCT VFR BLD CALC: 38.3 % (ref 35.4–49)
HGB BLD-MCNC: 13.1 GM/DL (ref 11.7–16.9)
MCH RBC QN AUTO: 30.1 PG (ref 25.7–33.7)
MCHC RBC AUTO-ENTMCNC: 34.1 G/DL (ref 32–35.9)
MCV RBC: 88.3 FL (ref 80–96)
PLATELET # BLD AUTO: 225 10^3/UL (ref 134–434)
PMV BLD: 8.4 FL (ref 7.5–11.1)
PROT SERPL-MCNC: 7.7 G/DL (ref 6.4–8.2)
RBC # BLD AUTO: 4.34 M/MM3 (ref 4–5.6)
SODIUM SERPL-SCNC: 137 MMOL/L (ref 136–145)
WBC # BLD AUTO: 3.9 K/MM3 (ref 4–10)

## 2023-02-13 RX ADMIN — TOLNAFTATE SCH: 10 CREAM TOPICAL at 10:12

## 2023-02-13 RX ADMIN — TOLNAFTATE SCH: 10 CREAM TOPICAL at 22:20

## 2023-02-13 RX ADMIN — Medication SCH MG: at 22:17

## 2023-02-13 RX ADMIN — Medication SCH TAB: at 10:12

## 2023-02-13 RX ADMIN — AMLODIPINE BESYLATE SCH MG: 2.5 TABLET ORAL at 10:10

## 2023-02-13 RX ADMIN — POLYVINYL ALCOHOL PRN DROP: 14 SOLUTION/ DROPS OPHTHALMIC at 10:14

## 2023-02-13 RX ADMIN — HYDROXYZINE PAMOATE PRN MG: 25 CAPSULE ORAL at 23:08

## 2023-02-13 RX ADMIN — Medication SCH MG: at 22:18

## 2023-02-14 VITALS
TEMPERATURE: 98.1 F | DIASTOLIC BLOOD PRESSURE: 85 MMHG | SYSTOLIC BLOOD PRESSURE: 143 MMHG | RESPIRATION RATE: 20 BRPM | HEART RATE: 80 BPM

## 2023-02-14 RX ADMIN — TOLNAFTATE SCH APPLIC: 10 CREAM TOPICAL at 08:59

## 2023-02-14 RX ADMIN — AMLODIPINE BESYLATE SCH MG: 2.5 TABLET ORAL at 08:59

## 2023-02-14 RX ADMIN — Medication SCH TAB: at 08:59

## 2023-02-24 NOTE — ED PROVIDER NOTE - ATTESTATION, MLM
94
I have reviewed and confirmed nurses' notes for patient's medications, allergies, medical history, and surgical history.
